# Patient Record
Sex: FEMALE | Race: WHITE | NOT HISPANIC OR LATINO
[De-identification: names, ages, dates, MRNs, and addresses within clinical notes are randomized per-mention and may not be internally consistent; named-entity substitution may affect disease eponyms.]

---

## 2017-01-09 ENCOUNTER — APPOINTMENT (OUTPATIENT)
Dept: DERMATOLOGY | Facility: CLINIC | Age: 77
End: 2017-01-09

## 2017-02-14 ENCOUNTER — APPOINTMENT (OUTPATIENT)
Dept: PLASTIC SURGERY | Facility: CLINIC | Age: 77
End: 2017-02-14

## 2017-02-14 ENCOUNTER — APPOINTMENT (OUTPATIENT)
Dept: DERMATOLOGY | Facility: CLINIC | Age: 77
End: 2017-02-14

## 2017-02-15 NOTE — ASU PATIENT PROFILE, ADULT - VISION (WITH CORRECTIVE LENSES IF THE PATIENT USUALLY WEARS THEM):
glasses for reading/Normal vision: sees adequately in most situations; can see medication labels, newsprint

## 2017-02-15 NOTE — ASU PATIENT PROFILE, ADULT - PMH
Acute deep vein thrombosis (DVT) of lower extremity, unspecified laterality, unspecified vein    BCC (basal cell carcinoma of skin)    Essential hypertension    Hypothyroidism, unspecified type    SCC (squamous cell carcinoma)    Spinal stenosis, unspecified spinal region

## 2017-02-16 ENCOUNTER — INPATIENT (INPATIENT)
Facility: HOSPITAL | Age: 77
LOS: 0 days | Discharge: HOME | End: 2017-02-17
Attending: SURGERY | Admitting: SURGERY
Payer: COMMERCIAL

## 2017-02-16 VITALS
DIASTOLIC BLOOD PRESSURE: 67 MMHG | HEART RATE: 87 BPM | SYSTOLIC BLOOD PRESSURE: 146 MMHG | RESPIRATION RATE: 18 BRPM | OXYGEN SATURATION: 97 % | WEIGHT: 110.01 LBS | TEMPERATURE: 98 F

## 2017-02-16 DIAGNOSIS — C44.319 BASAL CELL CARCINOMA OF SKIN OF OTHER PARTS OF FACE: ICD-10-CM

## 2017-02-16 DIAGNOSIS — Z90.710 ACQUIRED ABSENCE OF BOTH CERVIX AND UTERUS: Chronic | ICD-10-CM

## 2017-02-16 LAB
APTT BLD: 37.6 SEC — HIGH (ref 27.5–37.4)
BLD GP AB SCN SERPL QL: NEGATIVE — SIGNIFICANT CHANGE UP
GAS PNL BLDV: 135 MMOL/L — LOW (ref 136–146)
GLUCOSE BLDV-MCNC: 90 — SIGNIFICANT CHANGE UP (ref 70–99)
HCT VFR BLDV CALC: 27 % — LOW (ref 34.5–45)
INR BLD: 1.77 — HIGH (ref 0.87–1.18)
POTASSIUM BLDV-SCNC: 4 MMOL/L — SIGNIFICANT CHANGE UP (ref 3.4–4.5)
PROTHROM AB SERPL-ACNC: 20.3 SEC — HIGH (ref 10–13.1)
RH IG SCN BLD-IMP: POSITIVE — SIGNIFICANT CHANGE UP

## 2017-02-16 PROCEDURE — 15731 FOREHEAD FLAP W/VASC PEDICLE: CPT

## 2017-02-16 PROCEDURE — 14040 TIS TRNFR F/C/C/M/N/A/G/H/F: CPT

## 2017-02-16 PROCEDURE — 15260 FTH/GFT FR N/E/E/L 20 SQCM/<: CPT

## 2017-02-16 RX ORDER — SODIUM CHLORIDE 9 MG/ML
500 INJECTION, SOLUTION INTRAVENOUS ONCE
Qty: 0 | Refills: 0 | Status: COMPLETED | OUTPATIENT
Start: 2017-02-16 | End: 2017-02-16

## 2017-02-16 RX ORDER — LEVOTHYROXINE SODIUM 125 MCG
50 TABLET ORAL DAILY
Qty: 0 | Refills: 0 | Status: DISCONTINUED | OUTPATIENT
Start: 2017-02-16 | End: 2017-02-17

## 2017-02-16 RX ORDER — ACETAMINOPHEN 500 MG
650 TABLET ORAL EVERY 6 HOURS
Qty: 0 | Refills: 0 | Status: DISCONTINUED | OUTPATIENT
Start: 2017-02-16 | End: 2017-02-17

## 2017-02-16 RX ORDER — ONDANSETRON 8 MG/1
4 TABLET, FILM COATED ORAL ONCE
Qty: 0 | Refills: 0 | Status: DISCONTINUED | OUTPATIENT
Start: 2017-02-16 | End: 2017-02-16

## 2017-02-16 RX ORDER — SODIUM CHLORIDE 9 MG/ML
1000 INJECTION, SOLUTION INTRAVENOUS
Qty: 0 | Refills: 0 | Status: DISCONTINUED | OUTPATIENT
Start: 2017-02-16 | End: 2017-02-17

## 2017-02-16 RX ORDER — FENTANYL CITRATE 50 UG/ML
25 INJECTION INTRAVENOUS
Qty: 0 | Refills: 0 | Status: DISCONTINUED | OUTPATIENT
Start: 2017-02-16 | End: 2017-02-16

## 2017-02-16 RX ORDER — DIPHENHYDRAMINE HCL 50 MG
50 CAPSULE ORAL EVERY 4 HOURS
Qty: 0 | Refills: 0 | Status: DISCONTINUED | OUTPATIENT
Start: 2017-02-16 | End: 2017-02-17

## 2017-02-16 RX ORDER — BUDESONIDE AND FORMOTEROL FUMARATE DIHYDRATE 160; 4.5 UG/1; UG/1
2 AEROSOL RESPIRATORY (INHALATION)
Qty: 0 | Refills: 0 | Status: DISCONTINUED | OUTPATIENT
Start: 2017-02-16 | End: 2017-02-17

## 2017-02-16 RX ORDER — LOSARTAN POTASSIUM 100 MG/1
100 TABLET, FILM COATED ORAL DAILY
Qty: 0 | Refills: 0 | Status: DISCONTINUED | OUTPATIENT
Start: 2017-02-16 | End: 2017-02-17

## 2017-02-16 RX ORDER — OXYCODONE HYDROCHLORIDE 5 MG/1
5 TABLET ORAL EVERY 4 HOURS
Qty: 0 | Refills: 0 | Status: DISCONTINUED | OUTPATIENT
Start: 2017-02-16 | End: 2017-02-16

## 2017-02-16 RX ORDER — ONDANSETRON 8 MG/1
4 TABLET, FILM COATED ORAL EVERY 6 HOURS
Qty: 0 | Refills: 0 | Status: DISCONTINUED | OUTPATIENT
Start: 2017-02-16 | End: 2017-02-17

## 2017-02-16 RX ORDER — FENTANYL CITRATE 50 UG/ML
50 INJECTION INTRAVENOUS
Qty: 0 | Refills: 0 | Status: DISCONTINUED | OUTPATIENT
Start: 2017-02-16 | End: 2017-02-16

## 2017-02-16 RX ORDER — ALBUTEROL 90 UG/1
2.5 AEROSOL, METERED ORAL ONCE
Qty: 0 | Refills: 0 | Status: COMPLETED | OUTPATIENT
Start: 2017-02-16 | End: 2017-02-16

## 2017-02-16 RX ORDER — AMLODIPINE BESYLATE 2.5 MG/1
5 TABLET ORAL DAILY
Qty: 0 | Refills: 0 | Status: DISCONTINUED | OUTPATIENT
Start: 2017-02-16 | End: 2017-02-17

## 2017-02-16 RX ORDER — SODIUM CHLORIDE 9 MG/ML
1000 INJECTION, SOLUTION INTRAVENOUS
Qty: 0 | Refills: 0 | Status: DISCONTINUED | OUTPATIENT
Start: 2017-02-16 | End: 2017-02-16

## 2017-02-16 RX ADMIN — SODIUM CHLORIDE 75 MILLILITER(S): 9 INJECTION, SOLUTION INTRAVENOUS at 17:55

## 2017-02-16 RX ADMIN — BUDESONIDE AND FORMOTEROL FUMARATE DIHYDRATE 2 PUFF(S): 160; 4.5 AEROSOL RESPIRATORY (INHALATION) at 23:20

## 2017-02-16 RX ADMIN — SODIUM CHLORIDE 1000 MILLILITER(S): 9 INJECTION, SOLUTION INTRAVENOUS at 18:05

## 2017-02-16 RX ADMIN — SODIUM CHLORIDE 75 MILLILITER(S): 9 INJECTION, SOLUTION INTRAVENOUS at 22:03

## 2017-02-16 RX ADMIN — ALBUTEROL 2.5 MILLIGRAM(S): 90 AEROSOL, METERED ORAL at 19:19

## 2017-02-16 RX ADMIN — Medication 100 MILLIGRAM(S): at 22:03

## 2017-02-16 NOTE — H&P ADULT. - ENMT COMMENTS
nasal wound nasal wound with right total alar excision, 50% of left ala intact, exposed septum, total dorsal nasal skin loss, loss of upper vestibular mucosa b/l

## 2017-02-16 NOTE — H&P ADULT. - HISTORY OF PRESENT ILLNESS
Patient is a 75yo female with a history of multiple skin cancers including basal cell to the nose, SCC of the lip, and BCC to the left forehead. Patient underwent excision of nasal BCC in 2015 with inadequate margins. She underwent moh's excision of the BCC which left a near total defect of nasal skin. Patient with history of DVT in october 2016, currently on coumadin, but held for past 4 days. She is here today to undergo nasal reconstruction.  PMH: SCC and BCC, breast cancer, hypothyroidism  PSxHx: mastectomy, radiation to lip, hysterectomy, SCC and BCC excisions of the face Patient is a 77yo female with a history of multiple skin cancers including basal cell to the nose, SCC of the lip, and BCC to the left forehead. Patient underwent excision of nasal BCC in 2015 with inadequate margins. She underwent moh's excision of the BCC which left a near total defect of nasal skin. Patient with history of DVT in october 2016, currently on coumadin, but held for past 4 days. She is here today to undergo nasal reconstruction.  PMH: SCC and BCC, breast cancer, hypothyroidism, COPD  PSxHx: lumpectomy, radiation to lip, hysterectomy, SCC and BCC excisions of the face

## 2017-02-17 ENCOUNTER — TRANSCRIPTION ENCOUNTER (OUTPATIENT)
Age: 77
End: 2017-02-17

## 2017-02-17 VITALS
TEMPERATURE: 98 F | OXYGEN SATURATION: 98 % | HEART RATE: 96 BPM | RESPIRATION RATE: 16 BRPM | SYSTOLIC BLOOD PRESSURE: 109 MMHG | DIASTOLIC BLOOD PRESSURE: 52 MMHG

## 2017-02-17 RX ORDER — ACETAMINOPHEN 500 MG
2 TABLET ORAL
Qty: 0 | Refills: 0 | COMMUNITY
Start: 2017-02-17

## 2017-02-17 RX ORDER — OXYCODONE HYDROCHLORIDE 5 MG/1
1 TABLET ORAL
Qty: 30 | Refills: 0 | OUTPATIENT
Start: 2017-02-17 | End: 2017-02-22

## 2017-02-17 RX ADMIN — Medication 100 MILLIGRAM(S): at 05:21

## 2017-02-17 RX ADMIN — Medication 20 MILLIGRAM(S): at 13:25

## 2017-02-17 RX ADMIN — BUDESONIDE AND FORMOTEROL FUMARATE DIHYDRATE 2 PUFF(S): 160; 4.5 AEROSOL RESPIRATORY (INHALATION) at 13:25

## 2017-02-17 RX ADMIN — Medication 50 MICROGRAM(S): at 05:20

## 2017-02-17 RX ADMIN — LOSARTAN POTASSIUM 100 MILLIGRAM(S): 100 TABLET, FILM COATED ORAL at 06:01

## 2017-02-17 RX ADMIN — SODIUM CHLORIDE 75 MILLILITER(S): 9 INJECTION, SOLUTION INTRAVENOUS at 06:01

## 2017-02-17 RX ADMIN — Medication 100 MILLIGRAM(S): at 13:25

## 2017-02-17 RX ADMIN — AMLODIPINE BESYLATE 5 MILLIGRAM(S): 2.5 TABLET ORAL at 06:01

## 2017-02-17 NOTE — DISCHARGE NOTE ADULT - CARE PLAN
Principal Discharge DX:	BCC (basal cell carcinoma of skin)  Goal:	Postoperative Recovery  Instructions for follow-up, activity and diet:	Resume normal diet and activity. Avoid straining, exercise, or heavy lifting.    Take medications as instructed by prescriptions.  You are being discharged with a prescription for antibiotics and for medications to help with pain.    Follow-up with primary care doctor as well WITHIN 5 DAYS OF DISCHARGE TO RESUME COUMADIN    Call 911 and return to the ED for chest pain, shortness of breath, significant increase in pain, or significant change in color of surgical sites.  Goal:	Wound Care  Instructions for follow-up, activity and diet:	You should keep your wounds dry and away from water for 2 days after you leave the hospital.  After that, you may bathe and allow water to run over your wounds but please do not scrub your incisions or rub them dry.  You will be discharged with Visiting Nurse Services to perform daily dressing changes as follows:  - Remove existing xeroform from wound. Apply bacitracin to wound and incisions. Reapply new xeroform. Principal Discharge DX:	BCC (basal cell carcinoma of skin)  Goal:	Postoperative Recovery  Instructions for follow-up, activity and diet:	Resume normal diet and activity. Avoid straining, exercise, or heavy lifting.    Take medications as instructed by prescriptions.  You are being discharged with a prescription for antibiotics and for medications to help with pain.    Follow-up with primary care doctor as well WITHIN 5 DAYS OF DISCHARGE TO RESUME COUMADIN    Call 911 and return to the ED for chest pain, shortness of breath, significant increase in pain, or significant change in color of surgical sites.  Goal:	Wound Care  Instructions for follow-up, activity and diet:	You should keep your wounds dry and away from water for 2 days after you leave the hospital.  After that, you may bathe and allow water to run over your wounds but please do not scrub your incisions or rub them dry.  You will be discharged with Visiting Nurse Services to perform daily dressing changes as follows:  - Remove existing xeroform from wound on face and forehead. Apply bacitracin to wound and incisions. Reapply new xeroform.  You may then apply gauze on top of the xeroform and wrap the forehead in kerlix to keep the gauze in place.

## 2017-02-17 NOTE — DISCHARGE NOTE ADULT - PLAN OF CARE
Postoperative Recovery Resume normal diet and activity. Avoid straining, exercise, or heavy lifting.    Take medications as instructed by prescriptions.  You are being discharged with a prescription for antibiotics and for medications to help with pain.    Follow-up with primary care doctor as well WITHIN 5 DAYS OF DISCHARGE TO RESUME COUMADIN    Call 911 and return to the ED for chest pain, shortness of breath, significant increase in pain, or significant change in color of surgical sites. Wound Care You should keep your wounds dry and away from water for 2 days after you leave the hospital.  After that, you may bathe and allow water to run over your wounds but please do not scrub your incisions or rub them dry.  You will be discharged with Visiting Nurse Services to perform daily dressing changes as follows:  - Remove existing xeroform from wound. Apply bacitracin to wound and incisions. Reapply new xeroform. You should keep your wounds dry and away from water for 2 days after you leave the hospital.  After that, you may bathe and allow water to run over your wounds but please do not scrub your incisions or rub them dry.  You will be discharged with Visiting Nurse Services to perform daily dressing changes as follows:  - Remove existing xeroform from wound on face and forehead. Apply bacitracin to wound and incisions. Reapply new xeroform.  You may then apply gauze on top of the xeroform and wrap the forehead in kerlix to keep the gauze in place.

## 2017-02-17 NOTE — DISCHARGE NOTE ADULT - VISION (WITH CORRECTIVE LENSES IF THE PATIENT USUALLY WEARS THEM):
Normal vision: sees adequately in most situations; can see medication labels, newsprint/glasses for reading

## 2017-02-17 NOTE — DISCHARGE NOTE ADULT - CARE PROVIDERS DIRECT ADDRESSES
[email or web address]

## 2017-02-17 NOTE — DISCHARGE NOTE ADULT - CARE PROVIDER_API CALL
Feng Juan), Plastic Surgery  [address] [place] [address]  Phone: [phone]  Fax: [phone]

## 2017-02-17 NOTE — DISCHARGE NOTE ADULT - ADDITIONAL INSTRUCTIONS
Please follow up with Dr. Juan after discharge from the hospital. You may call [phone] to schedule an appointment. He would like you to follow up next week.

## 2017-02-17 NOTE — DISCHARGE NOTE ADULT - HOME CARE AGENCY
[facility] [phone]. Initial visit will be day after discharge home. A nurse will call prior to home visit

## 2017-02-17 NOTE — DISCHARGE NOTE ADULT - HOSPITAL COURSE
Ms. Gaytan underwent nasal reconstruction following excision of basal cell carcinoma with Dr. Juan in the OR on 2/16. The patient's nasal defect reconstruction was a first stage reconstruction using a paramedian forehead flap. The patient tolerated the procedure well. There were no complications. The patient was extubated in the OR. The patient was transferred to the PACU in stable condition. Overnight, the patient was monitored on a surgical floor and her pain was controlled with oral medications. On POD 1, the patient's pain was controlled and she was able to tolerate dressing changes. It was determined that she was stable for discharge with visiting nurse services to continue to provide daily dressing changes. At the time of discharge, the patient was hemodynamically stable, was tolerating PO diet, was voiding urine, was ambulating, and was comfortable with adequate pain control.

## 2017-02-17 NOTE — DISCHARGE NOTE ADULT - MEDICATION SUMMARY - MEDICATIONS TO TAKE
I will START or STAY ON the medications listed below when I get home from the hospital:    acetaminophen 325 mg oral tablet  -- 2 tab(s) by mouth every 6 hours, As needed, For Temp greater than 38 C (100.4 F)  -- Indication: For For fever    acetaminophen 325 mg oral tablet  -- 2 tab(s) by mouth every 6 hours, As needed, Mild Pain (1 - 3)  -- Indication: For For mild pain    acetaminophen-oxyCODONE 325 mg-5 mg oral tablet  -- 1 tab(s) by mouth every 4 hours, As needed, Moderate Pain (4 - 6) -for severe pain MDD:6 tabs  -- Indication: For For moderate to severe pain    losartan 100 mg oral tablet  -- 1 tab(s) by mouth once a day  -- Indication: For Home medication    Paxil CR 25 mg oral tablet, extended release  -- 1 tab(s) by mouth once a day (in the morning)  -- Indication: For Home medication    Symbicort 160 mcg-4.5 mcg/inh inhalation aerosol  -- 2 puff(s) inhaled 2 times a day  -- Indication: For Home medication    amLODIPine 5 mg oral tablet  -- 1 tab(s) by mouth once a day  -- Indication: For Home medication    clindamycin  -- 1 tab(s) by mouth 3 times a day  -- Indication: For Antibiotics    Synthroid 50 mcg (0.05 mg) oral tablet  -- 1 tab(s) by mouth once a day  -- Indication: For Home medication

## 2017-02-18 ENCOUNTER — TRANSCRIPTION ENCOUNTER (OUTPATIENT)
Age: 77
End: 2017-02-18

## 2017-02-23 ENCOUNTER — APPOINTMENT (OUTPATIENT)
Dept: PLASTIC SURGERY | Facility: CLINIC | Age: 77
End: 2017-02-23

## 2017-02-23 PROBLEM — I10 ESSENTIAL (PRIMARY) HYPERTENSION: Chronic | Status: ACTIVE | Noted: 2017-02-16

## 2017-02-23 PROBLEM — M48.00 SPINAL STENOSIS, SITE UNSPECIFIED: Chronic | Status: ACTIVE | Noted: 2017-02-16

## 2017-02-23 PROBLEM — E03.9 HYPOTHYROIDISM, UNSPECIFIED: Chronic | Status: ACTIVE | Noted: 2017-02-16

## 2017-03-02 ENCOUNTER — APPOINTMENT (OUTPATIENT)
Dept: PLASTIC SURGERY | Facility: CLINIC | Age: 77
End: 2017-03-02

## 2017-03-09 NOTE — BRIEF OPERATIVE NOTE - OPERATION/FINDINGS
Nose reconstructed with Right forehead flap. Skin flap taken from right chest for reconstruction of nasal mucosa. No complications occurred.
solids

## 2017-03-22 ENCOUNTER — OUTPATIENT (OUTPATIENT)
Dept: OUTPATIENT SERVICES | Facility: HOSPITAL | Age: 77
LOS: 1 days | End: 2017-03-22
Payer: COMMERCIAL

## 2017-03-22 VITALS
WEIGHT: 104.94 LBS | DIASTOLIC BLOOD PRESSURE: 73 MMHG | OXYGEN SATURATION: 98 % | HEIGHT: 60 IN | RESPIRATION RATE: 16 BRPM | HEART RATE: 77 BPM | TEMPERATURE: 97 F | SYSTOLIC BLOOD PRESSURE: 120 MMHG

## 2017-03-22 DIAGNOSIS — I10 ESSENTIAL (PRIMARY) HYPERTENSION: ICD-10-CM

## 2017-03-22 DIAGNOSIS — J44.9 CHRONIC OBSTRUCTIVE PULMONARY DISEASE, UNSPECIFIED: ICD-10-CM

## 2017-03-22 DIAGNOSIS — Z90.12 ACQUIRED ABSENCE OF LEFT BREAST AND NIPPLE: Chronic | ICD-10-CM

## 2017-03-22 DIAGNOSIS — H26.9 UNSPECIFIED CATARACT: Chronic | ICD-10-CM

## 2017-03-22 DIAGNOSIS — C44.311 BASAL CELL CARCINOMA OF SKIN OF NOSE: ICD-10-CM

## 2017-03-22 DIAGNOSIS — Z90.710 ACQUIRED ABSENCE OF BOTH CERVIX AND UTERUS: Chronic | ICD-10-CM

## 2017-03-22 DIAGNOSIS — Z98.890 OTHER SPECIFIED POSTPROCEDURAL STATES: Chronic | ICD-10-CM

## 2017-03-22 DIAGNOSIS — Z01.818 ENCOUNTER FOR OTHER PREPROCEDURAL EXAMINATION: ICD-10-CM

## 2017-03-22 DIAGNOSIS — I82.512 CHRONIC EMBOLISM AND THROMBOSIS OF LEFT FEMORAL VEIN: ICD-10-CM

## 2017-03-22 PROCEDURE — G0463: CPT

## 2017-03-22 RX ORDER — SODIUM CHLORIDE 9 MG/ML
3 INJECTION INTRAMUSCULAR; INTRAVENOUS; SUBCUTANEOUS EVERY 8 HOURS
Qty: 0 | Refills: 0 | Status: DISCONTINUED | OUTPATIENT
Start: 2017-03-29 | End: 2017-04-13

## 2017-03-22 RX ORDER — LIDOCAINE HCL 20 MG/ML
0.2 VIAL (ML) INJECTION ONCE
Qty: 0 | Refills: 0 | Status: DISCONTINUED | OUTPATIENT
Start: 2017-03-29 | End: 2017-04-13

## 2017-03-22 NOTE — H&P PST ADULT - HISTORY OF PRESENT ILLNESS
Patient is a 75yo female with a history of multiple skin cancers including basal cell to the nose, SCC of the lip, and BCC to the left forehead. Patient underwent excision of nasal BCC in 2015 with inadequate margins. She underwent moh's excision of the BCC which left a near total defect of nasal skin. Patient with history of DVT in october 2016, currently on coumadin, but held for past 4 days. She is here today to undergo nasal reconstruction.  PMH: SCC and BCC, breast cancer, hypothyroidism, COPD  PSxHx: lumpectomy, radiation to lip, hysterectomy, SCC and BCC excisions of the face Patient is a 77yo female with a history of multiple skin cancers including basal cell to the nose, SCC of the lip, and BCC to the left forehead. Patient underwent excision of nasal BCC in 2015 with inadequate margins. She underwent moh's excision of the BCC which left a near total defect of nasal skin. Patient with history of DVT in october 2016, currently on coumadin. S/P  nasal reconstruction 2/2017. Presents Nasal reconstruction, Flap revision costal cartilage graft  PMH: SCC and BCC, breast cancer, hypothyroidism, COPD  PSxHx: lumpectomy, radiation to lip, hysterectomy, SCC and BCC excisions of the face

## 2017-03-22 NOTE — H&P PST ADULT - PMH
Acute deep vein thrombosis (DVT) of lower extremity, unspecified laterality, unspecified vein    BCC (basal cell carcinoma of skin)    Essential hypertension    Hypothyroidism, unspecified type    SCC (squamous cell carcinoma)    Spinal stenosis, unspecified spinal region Acute deep vein thrombosis (DVT) of lower extremity, unspecified laterality, unspecified vein  left leg 10/2016 on Warfarin  BCC (basal cell carcinoma of skin)  s/p MOHS  Chronic obstructive pulmonary disease, unspecified COPD type  Pt was never hospitalized  for COPD  Essential hypertension    Glaucoma    Hypothyroidism, unspecified type    SCC (squamous cell carcinoma)    Spinal stenosis, unspecified spinal region

## 2017-03-22 NOTE — H&P PST ADULT - PROBLEM SELECTOR PLAN 1
Nasal Reconstruction, Flap Revision Costal Cartilage Graft  Check MMR for labs from Hematologist  Obtain copy of MC from 2/2017 Nasal Reconstruction, Flap Revision Costal Cartilage Graft  Check MMF for labs from Hematologist  Obtain copy of MC from 2/2017

## 2017-03-22 NOTE — H&P PST ADULT - PROBLEM SELECTOR PLAN 2
Dr Juan advised pt she can stay on Warfarin Spoke to Ashley Juan office and pt can stop Warfarin 3/24/17 , Called pt and left message. Spoke to Ashley Juan office and pt can stop Warfarin 3/24/17 , Called pt and left message. Ashley states to do STAT PT INR DOS

## 2017-03-22 NOTE — H&P PST ADULT - NSANTHOSAYNRD_GEN_A_CORE
No. YOSEF screening performed.  STOP BANG Legend: 0-2 = LOW Risk; 3-4 = INTERMEDIATE Risk; 5-8 = HIGH Risk

## 2017-03-22 NOTE — H&P PST ADULT - PSH
H/O total hysterectomy Bilateral cataracts    H/O total hysterectomy    S/P inguinal hernia repair    S/P lumpectomy, left breast  1995 & RADS  Status post nasal surgery  nasal reconstruction

## 2017-03-29 ENCOUNTER — OUTPATIENT (OUTPATIENT)
Dept: OUTPATIENT SERVICES | Facility: HOSPITAL | Age: 77
LOS: 1 days | End: 2017-03-29
Payer: COMMERCIAL

## 2017-03-29 VITALS
RESPIRATION RATE: 18 BRPM | DIASTOLIC BLOOD PRESSURE: 86 MMHG | HEART RATE: 79 BPM | OXYGEN SATURATION: 99 % | SYSTOLIC BLOOD PRESSURE: 146 MMHG

## 2017-03-29 VITALS
SYSTOLIC BLOOD PRESSURE: 98 MMHG | HEART RATE: 75 BPM | DIASTOLIC BLOOD PRESSURE: 63 MMHG | TEMPERATURE: 98 F | HEIGHT: 60 IN | OXYGEN SATURATION: 98 % | RESPIRATION RATE: 16 BRPM | WEIGHT: 104.94 LBS

## 2017-03-29 DIAGNOSIS — Z90.710 ACQUIRED ABSENCE OF BOTH CERVIX AND UTERUS: Chronic | ICD-10-CM

## 2017-03-29 DIAGNOSIS — Z98.890 OTHER SPECIFIED POSTPROCEDURAL STATES: Chronic | ICD-10-CM

## 2017-03-29 DIAGNOSIS — Z90.12 ACQUIRED ABSENCE OF LEFT BREAST AND NIPPLE: Chronic | ICD-10-CM

## 2017-03-29 DIAGNOSIS — H26.9 UNSPECIFIED CATARACT: Chronic | ICD-10-CM

## 2017-03-29 DIAGNOSIS — C44.311 BASAL CELL CARCINOMA OF SKIN OF NOSE: ICD-10-CM

## 2017-03-29 DIAGNOSIS — Z01.818 ENCOUNTER FOR OTHER PREPROCEDURAL EXAMINATION: ICD-10-CM

## 2017-03-29 PROCEDURE — 15731 FOREHEAD FLAP W/VASC PEDICLE: CPT

## 2017-03-29 PROCEDURE — 21235 EAR CARTILAGE GRAFT: CPT

## 2017-03-29 PROCEDURE — 30520 REPAIR OF NASAL SEPTUM: CPT | Mod: 58

## 2017-03-29 PROCEDURE — 30520 REPAIR OF NASAL SEPTUM: CPT

## 2017-03-29 PROCEDURE — 21235 EAR CARTILAGE GRAFT: CPT | Mod: 58,RT

## 2017-03-29 PROCEDURE — 14061 TIS TRNFR E/N/E/L10.1-30SQCM: CPT

## 2017-03-29 PROCEDURE — 14061 TIS TRNFR E/N/E/L10.1-30SQCM: CPT | Mod: 58

## 2017-03-29 RX ORDER — SODIUM CHLORIDE 9 MG/ML
1000 INJECTION INTRAMUSCULAR; INTRAVENOUS; SUBCUTANEOUS
Qty: 0 | Refills: 0 | Status: DISCONTINUED | OUTPATIENT
Start: 2017-03-29 | End: 2017-04-13

## 2017-03-29 RX ORDER — ONDANSETRON 8 MG/1
4 TABLET, FILM COATED ORAL ONCE
Qty: 0 | Refills: 0 | Status: COMPLETED | OUTPATIENT
Start: 2017-03-29 | End: 2017-03-29

## 2017-03-29 RX ORDER — ACETAMINOPHEN 500 MG
975 TABLET ORAL ONCE
Qty: 0 | Refills: 0 | Status: COMPLETED | OUTPATIENT
Start: 2017-03-29 | End: 2017-03-29

## 2017-03-29 RX ORDER — CELECOXIB 200 MG/1
200 CAPSULE ORAL ONCE
Qty: 0 | Refills: 0 | Status: DISCONTINUED | OUTPATIENT
Start: 2017-03-29 | End: 2017-04-13

## 2017-03-29 RX ORDER — OXYCODONE HYDROCHLORIDE 5 MG/1
5 TABLET ORAL ONCE
Qty: 0 | Refills: 0 | Status: DISCONTINUED | OUTPATIENT
Start: 2017-03-29 | End: 2017-03-29

## 2017-03-29 RX ORDER — CELECOXIB 200 MG/1
200 CAPSULE ORAL ONCE
Qty: 0 | Refills: 0 | Status: COMPLETED | OUTPATIENT
Start: 2017-03-29 | End: 2017-03-29

## 2017-03-29 RX ADMIN — OXYCODONE HYDROCHLORIDE 5 MILLIGRAM(S): 5 TABLET ORAL at 14:58

## 2017-03-29 RX ADMIN — ONDANSETRON 4 MILLIGRAM(S): 8 TABLET, FILM COATED ORAL at 15:03

## 2017-03-29 NOTE — BRIEF OPERATIVE NOTE - PROCEDURE
Reconstruction, nasal valve, using conchal cartilage graft  03/29/2017    Active  [facility]  Inset of forehead flap or revision of inset of forehead flap  03/29/2017    Active  [facility]

## 2017-03-29 NOTE — ASU PATIENT PROFILE, ADULT - PSH
Bilateral cataracts    H/O total hysterectomy    S/P inguinal hernia repair    S/P lumpectomy, left breast  1995 & RADS  Status post nasal surgery  nasal reconstruction

## 2017-03-29 NOTE — ASU DISCHARGE PLAN (ADULT/PEDIATRIC). - NOTIFY
Fever greater than 101/Bleeding that does not stop/Pain not relieved by Medications/Persistent Nausea and Vomiting/Swelling that continues

## 2017-03-29 NOTE — ASU DISCHARGE PLAN (ADULT/PEDIATRIC). - ITEMS TO FOLLOWUP WITH YOUR PHYSICIAN'S
Please follow up with Dr. Juan within x1 week after discharge from the hospital. You may call [phone] to schedule an appointment.

## 2017-03-29 NOTE — ASU PATIENT PROFILE, ADULT - PMH
Acute deep vein thrombosis (DVT) of lower extremity, unspecified laterality, unspecified vein  left leg 10/2016 on Warfarin  BCC (basal cell carcinoma of skin)  s/p MOHS  Chronic obstructive pulmonary disease, unspecified COPD type  Pt was never hospitalized  for COPD  Essential hypertension    Glaucoma    Hypothyroidism, unspecified type    SCC (squamous cell carcinoma)    Spinal stenosis, unspecified spinal region

## 2017-03-30 ENCOUNTER — APPOINTMENT (OUTPATIENT)
Dept: PLASTIC SURGERY | Facility: CLINIC | Age: 77
End: 2017-03-30

## 2017-03-30 PROBLEM — C44.91 BASAL CELL CARCINOMA OF SKIN, UNSPECIFIED: Chronic | Status: ACTIVE | Noted: 2017-02-16

## 2017-04-04 ENCOUNTER — APPOINTMENT (OUTPATIENT)
Dept: PLASTIC SURGERY | Facility: CLINIC | Age: 77
End: 2017-04-04

## 2017-04-06 ENCOUNTER — TRANSCRIPTION ENCOUNTER (OUTPATIENT)
Age: 77
End: 2017-04-06

## 2017-04-11 ENCOUNTER — OUTPATIENT (OUTPATIENT)
Dept: OUTPATIENT SERVICES | Facility: HOSPITAL | Age: 77
LOS: 1 days | End: 2017-04-11
Payer: COMMERCIAL

## 2017-04-11 VITALS
DIASTOLIC BLOOD PRESSURE: 70 MMHG | RESPIRATION RATE: 16 BRPM | SYSTOLIC BLOOD PRESSURE: 110 MMHG | HEART RATE: 75 BPM | TEMPERATURE: 98 F | WEIGHT: 100.09 LBS | OXYGEN SATURATION: 99 %

## 2017-04-11 DIAGNOSIS — Z90.710 ACQUIRED ABSENCE OF BOTH CERVIX AND UTERUS: Chronic | ICD-10-CM

## 2017-04-11 DIAGNOSIS — C44.91 BASAL CELL CARCINOMA OF SKIN, UNSPECIFIED: ICD-10-CM

## 2017-04-11 DIAGNOSIS — Z98.890 OTHER SPECIFIED POSTPROCEDURAL STATES: Chronic | ICD-10-CM

## 2017-04-11 DIAGNOSIS — E03.9 HYPOTHYROIDISM, UNSPECIFIED: ICD-10-CM

## 2017-04-11 DIAGNOSIS — Z94.5 SKIN TRANSPLANT STATUS: Chronic | ICD-10-CM

## 2017-04-11 DIAGNOSIS — I10 ESSENTIAL (PRIMARY) HYPERTENSION: ICD-10-CM

## 2017-04-11 DIAGNOSIS — C44.310 BASAL CELL CARCINOMA OF SKIN OF UNSPECIFIED PARTS OF FACE: Chronic | ICD-10-CM

## 2017-04-11 DIAGNOSIS — I82.409 ACUTE EMBOLISM AND THROMBOSIS OF UNSPECIFIED DEEP VEINS OF UNSPECIFIED LOWER EXTREMITY: ICD-10-CM

## 2017-04-11 DIAGNOSIS — C44.311 BASAL CELL CARCINOMA OF SKIN OF NOSE: ICD-10-CM

## 2017-04-11 DIAGNOSIS — J44.9 CHRONIC OBSTRUCTIVE PULMONARY DISEASE, UNSPECIFIED: ICD-10-CM

## 2017-04-11 DIAGNOSIS — Z85.828 PERSONAL HISTORY OF OTHER MALIGNANT NEOPLASM OF SKIN: Chronic | ICD-10-CM

## 2017-04-11 DIAGNOSIS — H26.9 UNSPECIFIED CATARACT: Chronic | ICD-10-CM

## 2017-04-11 DIAGNOSIS — Z90.12 ACQUIRED ABSENCE OF LEFT BREAST AND NIPPLE: Chronic | ICD-10-CM

## 2017-04-11 LAB
APTT BLD: 44.1 SEC — HIGH (ref 27.5–37.4)
BUN SERPL-MCNC: 16 MG/DL — SIGNIFICANT CHANGE UP (ref 7–23)
CALCIUM SERPL-MCNC: 9.5 MG/DL — SIGNIFICANT CHANGE UP (ref 8.4–10.5)
CHLORIDE SERPL-SCNC: 99 MMOL/L — SIGNIFICANT CHANGE UP (ref 98–107)
CO2 SERPL-SCNC: 24 MMOL/L — SIGNIFICANT CHANGE UP (ref 22–31)
CREAT SERPL-MCNC: 0.75 MG/DL — SIGNIFICANT CHANGE UP (ref 0.5–1.3)
GLUCOSE SERPL-MCNC: 86 MG/DL — SIGNIFICANT CHANGE UP (ref 70–99)
HCT VFR BLD CALC: 29.4 % — LOW (ref 34.5–45)
HGB BLD-MCNC: 8.9 G/DL — LOW (ref 11.5–15.5)
INR BLD: 2.53 — HIGH (ref 0.88–1.17)
MCHC RBC-ENTMCNC: 24.5 PG — LOW (ref 27–34)
MCHC RBC-ENTMCNC: 30.3 % — LOW (ref 32–36)
MCV RBC AUTO: 81 FL — SIGNIFICANT CHANGE UP (ref 80–100)
PLATELET # BLD AUTO: 427 K/UL — HIGH (ref 150–400)
PMV BLD: 8.2 FL — SIGNIFICANT CHANGE UP (ref 7–13)
POTASSIUM SERPL-MCNC: 4.4 MMOL/L — SIGNIFICANT CHANGE UP (ref 3.5–5.3)
POTASSIUM SERPL-SCNC: 4.4 MMOL/L — SIGNIFICANT CHANGE UP (ref 3.5–5.3)
PROTHROM AB SERPL-ACNC: 28.9 SEC — HIGH (ref 9.8–13.1)
RBC # BLD: 3.63 M/UL — LOW (ref 3.8–5.2)
RBC # FLD: 20.8 % — HIGH (ref 10.3–14.5)
SODIUM SERPL-SCNC: 138 MMOL/L — SIGNIFICANT CHANGE UP (ref 135–145)
WBC # BLD: 7.1 K/UL — SIGNIFICANT CHANGE UP (ref 3.8–10.5)
WBC # FLD AUTO: 7.1 K/UL — SIGNIFICANT CHANGE UP (ref 3.8–10.5)

## 2017-04-11 PROCEDURE — 93010 ELECTROCARDIOGRAM REPORT: CPT

## 2017-04-11 RX ORDER — WARFARIN SODIUM 2.5 MG/1
1 TABLET ORAL
Qty: 0 | Refills: 0 | COMMUNITY

## 2017-04-11 RX ORDER — AMLODIPINE BESYLATE 2.5 MG/1
1 TABLET ORAL
Qty: 0 | Refills: 0 | COMMUNITY

## 2017-04-11 RX ORDER — LOSARTAN POTASSIUM 100 MG/1
1 TABLET, FILM COATED ORAL
Qty: 0 | Refills: 0 | COMMUNITY

## 2017-04-11 RX ORDER — BUDESONIDE AND FORMOTEROL FUMARATE DIHYDRATE 160; 4.5 UG/1; UG/1
2 AEROSOL RESPIRATORY (INHALATION)
Qty: 0 | Refills: 0 | COMMUNITY

## 2017-04-11 RX ORDER — LEVOTHYROXINE SODIUM 125 MCG
1 TABLET ORAL
Qty: 0 | Refills: 0 | COMMUNITY

## 2017-04-11 RX ORDER — FAMOTIDINE 10 MG/ML
1 INJECTION INTRAVENOUS
Qty: 0 | Refills: 0 | COMMUNITY

## 2017-04-11 NOTE — H&P PST ADULT - NEGATIVE CARDIOVASCULAR SYMPTOMS
no palpitations/no peripheral edema/no claudication/no paroxysmal nocturnal dyspnea/no orthopnea/no chest pain/no dyspnea on exertion

## 2017-04-11 NOTE — H&P PST ADULT - HISTORY OF PRESENT ILLNESS
76 year old female with Hx of basal cell carcinoma of skin, and multiple skin cancers including SCC of the lip, and BCC to the left forehead. Pt underwent nasal reconstruction, costal cartilage graft, flap revision on 3/29/17. Pt is scheduled for forehead flap takedown, local tissue rearrangement for 4/19/17. Denies bleeding, swelling, and pain to area. Changes dressing d aily

## 2017-04-11 NOTE — H&P PST ADULT - NEGATIVE ENMT SYMPTOMS
no nasal obstruction/no nasal congestion/no dysphagia/no ear pain/no tinnitus/no hearing difficulty/no sinus symptoms/no throat pain/no nasal discharge/no vertigo

## 2017-04-11 NOTE — H&P PST ADULT - NEGATIVE GENERAL GENITOURINARY SYMPTOMS
no flank pain L/no dysuria/no renal colic/no hematuria/no urinary hesitancy/no incontinence/normal urinary frequency/no bladder infections/no flank pain R

## 2017-04-11 NOTE — H&P PST ADULT - NEGATIVE NEUROLOGICAL SYMPTOMS
no focal seizures/no syncope/no loss of sensation/no tremors/no headache/no transient paralysis/no vertigo/no difficulty walking/no confusion/no weakness/no generalized seizures/no paresthesias/no facial palsy

## 2017-04-11 NOTE — H&P PST ADULT - RS GEN PE MLT RESP DETAILS PC
no subcutaneous emphysema/no chest wall tenderness/no rales/no intercostal retractions/good air movement/clear to auscultation bilaterally/no wheezes/respirations non-labored/breath sounds equal/airway patent/no rhonchi

## 2017-04-11 NOTE — H&P PST ADULT - RESPIRATORY AND THORAX COMMENTS
COPD managed on inhalers, no recent exacerbations, never hospitalized COPD managed on inhaler, no recent exacerbations, never hospitalized

## 2017-04-11 NOTE — H&P PST ADULT - PMH
Acute deep vein thrombosis (DVT) of lower extremity, unspecified laterality, unspecified vein  left leg 10/2016 on Warfarin  BCC (basal cell carcinoma of skin)  s/p MOHS  Chronic obstructive pulmonary disease, unspecified COPD type  Pt was never hospitalized  for COPD  Essential hypertension    Glaucoma    Hypothyroidism, unspecified type    SCC (squamous cell carcinoma)    Spinal stenosis, unspecified spinal region Acute deep vein thrombosis (DVT) of lower extremity, unspecified laterality, unspecified vein  left leg 10/2016 on Warfarin  Anemia  on iron infusions  BCC (basal cell carcinoma of skin)  s/p MOHS  Chronic obstructive pulmonary disease, unspecified COPD type  Pt was never hospitalized  for COPD  Depression    Essential hypertension    Glaucoma    Hypothyroidism, unspecified type    SCC (squamous cell carcinoma)    Spinal stenosis, unspecified spinal region

## 2017-04-11 NOTE — H&P PST ADULT - PSH
Bilateral cataracts    H/O total hysterectomy    S/P inguinal hernia repair    S/P lumpectomy, left breast  1995 & RADS  Status post nasal surgery  nasal reconstruction  Status post skin flap graft  nasal reconstruction, costal cartilage graft, flap revision on 3/29/17 Bilateral cataracts    H/O total hysterectomy    History of Mohs surgery for squamous cell carcinoma of skin  2/2017  S/P inguinal hernia repair    S/P lumpectomy, left breast  1995 & RADS  Status post skin flap graft  nasal reconstruction, costal cartilage graft, flap revision on 3/29/17

## 2017-04-11 NOTE — H&P PST ADULT - PROBLEM SELECTOR PLAN 1
Pt is scheduled for forehead flap takedown, local tissue rearrangement for 4/19/17. Preop instructions, Pepcid provided. Pt stated understanding

## 2017-04-11 NOTE — H&P PST ADULT - NEGATIVE MUSCULOSKELETAL SYMPTOMS
no myalgia/no stiffness/no muscle weakness/no arthralgia/no arthritis/no joint swelling/no neck pain

## 2017-04-11 NOTE — H&P PST ADULT - MUSCULOSKELETAL
details… detailed exam normal strength/no joint erythema/ROM intact/no joint warmth/no calf tenderness/no joint swelling

## 2017-04-11 NOTE — H&P PST ADULT - GASTROINTESTINAL DETAILS
soft/no guarding/no organomegaly/no bruit/no rigidity/nontender/no rebound tenderness/no masses palpable/no distention/bowel sounds normal

## 2017-04-19 ENCOUNTER — OUTPATIENT (OUTPATIENT)
Dept: OUTPATIENT SERVICES | Facility: HOSPITAL | Age: 77
LOS: 1 days | Discharge: HOME | End: 2017-04-19
Payer: COMMERCIAL

## 2017-04-19 VITALS
OXYGEN SATURATION: 97 % | SYSTOLIC BLOOD PRESSURE: 107 MMHG | HEART RATE: 84 BPM | RESPIRATION RATE: 16 BRPM | DIASTOLIC BLOOD PRESSURE: 52 MMHG | TEMPERATURE: 98 F

## 2017-04-19 VITALS
DIASTOLIC BLOOD PRESSURE: 50 MMHG | SYSTOLIC BLOOD PRESSURE: 130 MMHG | WEIGHT: 100.09 LBS | RESPIRATION RATE: 18 BRPM | TEMPERATURE: 97 F | HEART RATE: 78 BPM | OXYGEN SATURATION: 100 %

## 2017-04-19 DIAGNOSIS — C44.311 BASAL CELL CARCINOMA OF SKIN OF NOSE: ICD-10-CM

## 2017-04-19 DIAGNOSIS — Z90.710 ACQUIRED ABSENCE OF BOTH CERVIX AND UTERUS: Chronic | ICD-10-CM

## 2017-04-19 DIAGNOSIS — Z94.5 SKIN TRANSPLANT STATUS: Chronic | ICD-10-CM

## 2017-04-19 DIAGNOSIS — Z85.828 PERSONAL HISTORY OF OTHER MALIGNANT NEOPLASM OF SKIN: Chronic | ICD-10-CM

## 2017-04-19 DIAGNOSIS — H26.9 UNSPECIFIED CATARACT: Chronic | ICD-10-CM

## 2017-04-19 DIAGNOSIS — Z98.890 OTHER SPECIFIED POSTPROCEDURAL STATES: Chronic | ICD-10-CM

## 2017-04-19 DIAGNOSIS — Z90.12 ACQUIRED ABSENCE OF LEFT BREAST AND NIPPLE: Chronic | ICD-10-CM

## 2017-04-19 LAB
APTT BLD: 31.7 SEC — SIGNIFICANT CHANGE UP (ref 27.5–37.4)
INR BLD: 0.98 — SIGNIFICANT CHANGE UP (ref 0.88–1.17)
PROTHROM AB SERPL-ACNC: 11 SEC — SIGNIFICANT CHANGE UP (ref 9.8–13.1)

## 2017-04-19 PROCEDURE — 14040 TIS TRNFR F/C/C/M/N/A/G/H/F: CPT | Mod: 58

## 2017-04-19 PROCEDURE — 14061 TIS TRNFR E/N/E/L10.1-30SQCM: CPT | Mod: 58

## 2017-04-19 NOTE — ASU DISCHARGE PLAN (ADULT/PEDIATRIC). - NURSING INSTRUCTIONS
Narcotic pain medicine is constipating , Buy over the counter stool softener and take as instructed on the bottle.   DO NOT take any Tylenol (Acetaminophen) or narcotics containing Tylenol until after  _10pm_____ . You received Tylenol during your operation and it can cause damage to your liver if too much is taken within a 24 hour time period.

## 2017-04-19 NOTE — ASU DISCHARGE PLAN (ADULT/PEDIATRIC). - NOTIFY
Persistent Nausea and Vomiting/Unable to Urinate/Bleeding that does not stop/Fever greater than 101/Swelling that continues

## 2017-04-19 NOTE — ASU DISCHARGE PLAN (ADULT/PEDIATRIC). - INSTRUCTIONS
progress slowly,avoid any foods that are spicy, greasy or fatty, increase fluids and resume regular diet in am for next week

## 2017-04-20 ENCOUNTER — TRANSCRIPTION ENCOUNTER (OUTPATIENT)
Age: 77
End: 2017-04-20

## 2017-04-27 ENCOUNTER — APPOINTMENT (OUTPATIENT)
Dept: PLASTIC SURGERY | Facility: CLINIC | Age: 77
End: 2017-04-27

## 2017-05-16 ENCOUNTER — APPOINTMENT (OUTPATIENT)
Dept: PLASTIC SURGERY | Facility: CLINIC | Age: 77
End: 2017-05-16

## 2017-05-30 ENCOUNTER — LABORATORY RESULT (OUTPATIENT)
Age: 77
End: 2017-05-30

## 2017-05-30 ENCOUNTER — APPOINTMENT (OUTPATIENT)
Dept: DERMATOLOGY | Facility: CLINIC | Age: 77
End: 2017-05-30

## 2017-05-30 ENCOUNTER — RESULT REVIEW (OUTPATIENT)
Age: 77
End: 2017-05-30

## 2017-05-30 RX ORDER — PAROXETINE HYDROCHLORIDE 25 MG/1
25 TABLET, FILM COATED, EXTENDED RELEASE ORAL
Qty: 90 | Refills: 0 | Status: ACTIVE | COMMUNITY
Start: 2017-05-12

## 2017-06-12 ENCOUNTER — APPOINTMENT (OUTPATIENT)
Dept: DERMATOLOGY | Facility: CLINIC | Age: 77
End: 2017-06-12

## 2017-06-13 ENCOUNTER — APPOINTMENT (OUTPATIENT)
Dept: PLASTIC SURGERY | Facility: CLINIC | Age: 77
End: 2017-06-13

## 2017-07-11 ENCOUNTER — APPOINTMENT (OUTPATIENT)
Dept: PLASTIC SURGERY | Facility: CLINIC | Age: 77
End: 2017-07-11
Payer: COMMERCIAL

## 2017-07-11 VITALS — SYSTOLIC BLOOD PRESSURE: 108 MMHG | DIASTOLIC BLOOD PRESSURE: 66 MMHG | HEART RATE: 76 BPM

## 2017-07-11 PROCEDURE — 14040 TIS TRNFR F/C/C/M/N/A/G/H/F: CPT | Mod: 78

## 2017-07-18 ENCOUNTER — APPOINTMENT (OUTPATIENT)
Dept: PLASTIC SURGERY | Facility: CLINIC | Age: 77
End: 2017-07-18

## 2017-08-15 ENCOUNTER — OUTPATIENT (OUTPATIENT)
Dept: OUTPATIENT SERVICES | Facility: HOSPITAL | Age: 77
LOS: 1 days | End: 2017-08-15
Payer: COMMERCIAL

## 2017-08-15 VITALS
HEIGHT: 61 IN | TEMPERATURE: 97 F | RESPIRATION RATE: 14 BRPM | WEIGHT: 104.06 LBS | DIASTOLIC BLOOD PRESSURE: 62 MMHG | HEART RATE: 74 BPM | SYSTOLIC BLOOD PRESSURE: 102 MMHG | OXYGEN SATURATION: 99 %

## 2017-08-15 DIAGNOSIS — Z90.710 ACQUIRED ABSENCE OF BOTH CERVIX AND UTERUS: Chronic | ICD-10-CM

## 2017-08-15 DIAGNOSIS — H26.9 UNSPECIFIED CATARACT: Chronic | ICD-10-CM

## 2017-08-15 DIAGNOSIS — C44.91 BASAL CELL CARCINOMA OF SKIN, UNSPECIFIED: ICD-10-CM

## 2017-08-15 DIAGNOSIS — C44.311 BASAL CELL CARCINOMA OF SKIN OF NOSE: ICD-10-CM

## 2017-08-15 DIAGNOSIS — Z85.828 PERSONAL HISTORY OF OTHER MALIGNANT NEOPLASM OF SKIN: Chronic | ICD-10-CM

## 2017-08-15 DIAGNOSIS — Z94.5 SKIN TRANSPLANT STATUS: Chronic | ICD-10-CM

## 2017-08-15 DIAGNOSIS — Z98.890 OTHER SPECIFIED POSTPROCEDURAL STATES: Chronic | ICD-10-CM

## 2017-08-15 DIAGNOSIS — Z90.12 ACQUIRED ABSENCE OF LEFT BREAST AND NIPPLE: Chronic | ICD-10-CM

## 2017-08-15 LAB
BUN SERPL-MCNC: 14 MG/DL — SIGNIFICANT CHANGE UP (ref 7–23)
CALCIUM SERPL-MCNC: 9.6 MG/DL — SIGNIFICANT CHANGE UP (ref 8.4–10.5)
CHLORIDE SERPL-SCNC: 98 MMOL/L — SIGNIFICANT CHANGE UP (ref 98–107)
CO2 SERPL-SCNC: 23 MMOL/L — SIGNIFICANT CHANGE UP (ref 22–31)
CREAT SERPL-MCNC: 0.76 MG/DL — SIGNIFICANT CHANGE UP (ref 0.5–1.3)
GLUCOSE SERPL-MCNC: 76 MG/DL — SIGNIFICANT CHANGE UP (ref 70–99)
HCT VFR BLD CALC: 33.4 % — LOW (ref 34.5–45)
HGB BLD-MCNC: 10.3 G/DL — LOW (ref 11.5–15.5)
MCHC RBC-ENTMCNC: 25.1 PG — LOW (ref 27–34)
MCHC RBC-ENTMCNC: 30.8 % — LOW (ref 32–36)
MCV RBC AUTO: 81.3 FL — SIGNIFICANT CHANGE UP (ref 80–100)
NRBC # FLD: 0 — SIGNIFICANT CHANGE UP
PLATELET # BLD AUTO: 362 K/UL — SIGNIFICANT CHANGE UP (ref 150–400)
PMV BLD: 8.8 FL — SIGNIFICANT CHANGE UP (ref 7–13)
POTASSIUM SERPL-MCNC: 4.5 MMOL/L — SIGNIFICANT CHANGE UP (ref 3.5–5.3)
POTASSIUM SERPL-SCNC: 4.5 MMOL/L — SIGNIFICANT CHANGE UP (ref 3.5–5.3)
RBC # BLD: 4.11 M/UL — SIGNIFICANT CHANGE UP (ref 3.8–5.2)
RBC # FLD: 17.2 % — HIGH (ref 10.3–14.5)
SODIUM SERPL-SCNC: 136 MMOL/L — SIGNIFICANT CHANGE UP (ref 135–145)
WBC # BLD: 8.88 K/UL — SIGNIFICANT CHANGE UP (ref 3.8–10.5)
WBC # FLD AUTO: 8.88 K/UL — SIGNIFICANT CHANGE UP (ref 3.8–10.5)

## 2017-08-15 PROCEDURE — 93010 ELECTROCARDIOGRAM REPORT: CPT

## 2017-08-15 NOTE — H&P PST ADULT - HISTORY OF PRESENT ILLNESS
76 year old female with a history of hypothyroidism, breast cancer s/p lumpectomy and radiation, SCC s/p MOH's, hypertension, anemia, COPD, and depression presents for pre-op evaluation for scheduled open rhinoplasty, debulking procedure scheduled for 8/23/17. 76 year old female with a history of hypothyroidism, breast cancer s/p lumpectomy and radiation, SCC on nose s/p MOH's, hypertension, anemia, COPD, and depression presents for pre-op evaluation for scheduled open rhinoplasty, debulking procedure scheduled for 8/23/17.

## 2017-08-15 NOTE — H&P PST ADULT - ASSESSMENT
76 year old female presents with basal cell carcinoma of the nose scheduled for an open rhinoplasty and debulking on 8/23/17.

## 2017-08-15 NOTE — H&P PST ADULT - PMH
Acute deep vein thrombosis (DVT) of lower extremity, unspecified laterality, unspecified vein  left leg 10/2016 on Warfarin until 6/17  Anemia  on iron infusions  BCC (basal cell carcinoma of skin)  s/p MOHS  Chronic obstructive pulmonary disease, unspecified COPD type  Pt was never hospitalized  for COPD  Depression    Essential hypertension    Glaucoma    Hypothyroidism, unspecified type    Spinal stenosis, unspecified spinal region

## 2017-08-15 NOTE — H&P PST ADULT - PSH
Bilateral cataracts    H/O total hysterectomy    History of Mohs surgery for squamous cell carcinoma of skin  2/2017  S/P inguinal hernia repair    S/P lumpectomy, left breast  1995 & RADS  Status post skin flap graft  nasal reconstruction, costal cartilage graft, flap revision on 3/29/17

## 2017-08-15 NOTE — H&P PST ADULT - WEIGHT IN LBS
Health Maintenance Summary     Topic Due On Due Status Completed On    Colorectal Cancer Screening - Colonoscopy Jun 7, 2021 Not Due Jun 7, 2011    Immunization-Zoster  Completed Apr 13, 2017    Immunization - Pneumococcal Jul 13, 2016 Overdue Jul 13, 2015    Abdominal Aortic Aneurysm (AAA) Screening  Apr 20, 2010 Overdue     Medicare Wellness Visit Jul 12, 2018 Not Due Jul 12, 2017    IMMUNIZATION - DTaP/Tdap/Td Aug 27, 2009 Overdue Aug 26, 2009    Immunization-Influenza Sep 1, 2017 Not Due Dec 16, 2015          Patient is due for topics as listed above, his wife wishes to decline Pneumo 23 vaccine at this time; wife believes he had this at [facility] ..  Contacted [facility] - no Pneumonia 23 vaccine given recently. Dr Lam to discuss AAA screening if needed.  Wife here during visit to assist with questions.  Last eye exam was April/May 2017 at [facility] per wife     104

## 2017-08-23 ENCOUNTER — OUTPATIENT (OUTPATIENT)
Dept: OUTPATIENT SERVICES | Facility: HOSPITAL | Age: 77
LOS: 1 days | Discharge: HOME | End: 2017-08-23
Payer: COMMERCIAL

## 2017-08-23 VITALS
WEIGHT: 104.06 LBS | TEMPERATURE: 98 F | DIASTOLIC BLOOD PRESSURE: 54 MMHG | HEIGHT: 61 IN | OXYGEN SATURATION: 100 % | HEART RATE: 62 BPM | RESPIRATION RATE: 16 BRPM | SYSTOLIC BLOOD PRESSURE: 129 MMHG

## 2017-08-23 VITALS
OXYGEN SATURATION: 100 % | SYSTOLIC BLOOD PRESSURE: 90 MMHG | RESPIRATION RATE: 19 BRPM | DIASTOLIC BLOOD PRESSURE: 42 MMHG | HEART RATE: 92 BPM

## 2017-08-23 DIAGNOSIS — Z90.12 ACQUIRED ABSENCE OF LEFT BREAST AND NIPPLE: Chronic | ICD-10-CM

## 2017-08-23 DIAGNOSIS — H26.9 UNSPECIFIED CATARACT: Chronic | ICD-10-CM

## 2017-08-23 DIAGNOSIS — Z85.828 PERSONAL HISTORY OF OTHER MALIGNANT NEOPLASM OF SKIN: Chronic | ICD-10-CM

## 2017-08-23 DIAGNOSIS — Z98.890 OTHER SPECIFIED POSTPROCEDURAL STATES: Chronic | ICD-10-CM

## 2017-08-23 DIAGNOSIS — Z94.5 SKIN TRANSPLANT STATUS: Chronic | ICD-10-CM

## 2017-08-23 DIAGNOSIS — Z90.710 ACQUIRED ABSENCE OF BOTH CERVIX AND UTERUS: Chronic | ICD-10-CM

## 2017-08-23 DIAGNOSIS — C44.311 BASAL CELL CARCINOMA OF SKIN OF NOSE: ICD-10-CM

## 2017-08-23 PROCEDURE — 14040 TIS TRNFR F/C/C/M/N/A/G/H/F: CPT

## 2017-08-23 PROCEDURE — 14060 TIS TRNFR E/N/E/L 10 SQ CM/<: CPT

## 2017-08-23 NOTE — ASU DISCHARGE PLAN (ADULT/PEDIATRIC). - NOTIFY
Swelling that continues/Unable to Urinate/Persistent Nausea and Vomiting/Pain not relieved by Medications/Excessive Diarrhea/Inability to Tolerate Liquids or Foods/Increased Irritability or Sluggishness/Fever greater than 101/Numbness, tingling/Bleeding that does not stop

## 2017-08-24 ENCOUNTER — TRANSCRIPTION ENCOUNTER (OUTPATIENT)
Age: 77
End: 2017-08-24

## 2017-08-29 ENCOUNTER — APPOINTMENT (OUTPATIENT)
Dept: PLASTIC SURGERY | Facility: CLINIC | Age: 77
End: 2017-08-29
Payer: MEDICARE

## 2017-08-29 PROBLEM — I82.409 ACUTE EMBOLISM AND THROMBOSIS OF UNSPECIFIED DEEP VEINS OF UNSPECIFIED LOWER EXTREMITY: Chronic | Status: ACTIVE | Noted: 2017-02-16

## 2017-08-29 PROCEDURE — 99024 POSTOP FOLLOW-UP VISIT: CPT

## 2017-09-12 ENCOUNTER — APPOINTMENT (OUTPATIENT)
Dept: PLASTIC SURGERY | Facility: CLINIC | Age: 77
End: 2017-09-12
Payer: MEDICARE

## 2017-09-12 PROCEDURE — 99024 POSTOP FOLLOW-UP VISIT: CPT

## 2017-10-02 ENCOUNTER — APPOINTMENT (OUTPATIENT)
Dept: DERMATOLOGY | Facility: CLINIC | Age: 77
End: 2017-10-02
Payer: COMMERCIAL

## 2017-10-02 PROCEDURE — 11900 INJECT SKIN LESIONS </W 7: CPT

## 2017-10-02 PROCEDURE — 99214 OFFICE O/P EST MOD 30 MIN: CPT | Mod: 25

## 2017-12-19 ENCOUNTER — APPOINTMENT (OUTPATIENT)
Dept: PLASTIC SURGERY | Facility: CLINIC | Age: 77
End: 2017-12-19
Payer: MEDICARE

## 2017-12-19 PROCEDURE — 99213 OFFICE O/P EST LOW 20 MIN: CPT

## 2018-03-27 ENCOUNTER — RESULT REVIEW (OUTPATIENT)
Age: 78
End: 2018-03-27

## 2018-04-02 ENCOUNTER — APPOINTMENT (OUTPATIENT)
Dept: DERMATOLOGY | Facility: CLINIC | Age: 78
End: 2018-04-02

## 2018-05-15 ENCOUNTER — APPOINTMENT (OUTPATIENT)
Dept: THORACIC SURGERY | Facility: CLINIC | Age: 78
End: 2018-05-15
Payer: COMMERCIAL

## 2018-05-15 VITALS
HEART RATE: 102 BPM | HEIGHT: 61 IN | SYSTOLIC BLOOD PRESSURE: 126 MMHG | BODY MASS INDEX: 20.2 KG/M2 | DIASTOLIC BLOOD PRESSURE: 74 MMHG | WEIGHT: 107 LBS | OXYGEN SATURATION: 98 % | RESPIRATION RATE: 16 BRPM

## 2018-05-15 DIAGNOSIS — R59.0 LOCALIZED ENLARGED LYMPH NODES: ICD-10-CM

## 2018-05-15 DIAGNOSIS — C44.319 BASAL CELL CARCINOMA OF SKIN OF OTHER PARTS OF FACE: ICD-10-CM

## 2018-05-15 DIAGNOSIS — C44.311 BASAL CELL CARCINOMA OF SKIN OF NOSE: ICD-10-CM

## 2018-05-15 DIAGNOSIS — Z80.1 FAMILY HISTORY OF MALIGNANT NEOPLASM OF TRACHEA, BRONCHUS AND LUNG: ICD-10-CM

## 2018-05-15 DIAGNOSIS — D48.5 NEOPLASM OF UNCERTAIN BEHAVIOR OF SKIN: ICD-10-CM

## 2018-05-15 DIAGNOSIS — L91.0 HYPERTROPHIC SCAR: ICD-10-CM

## 2018-05-15 DIAGNOSIS — R91.8 OTHER NONSPECIFIC ABNORMAL FINDING OF LUNG FIELD: ICD-10-CM

## 2018-05-15 DIAGNOSIS — Z85.828 PERSONAL HISTORY OF OTHER MALIGNANT NEOPLASM OF SKIN: ICD-10-CM

## 2018-05-15 DIAGNOSIS — Z87.09 PERSONAL HISTORY OF OTHER DISEASES OF THE RESPIRATORY SYSTEM: ICD-10-CM

## 2018-05-15 DIAGNOSIS — L82.1 OTHER SEBORRHEIC KERATOSIS: ICD-10-CM

## 2018-05-15 DIAGNOSIS — L67.9 HAIR COLOR AND HAIR SHAFT ABNORMALITY, UNSPECIFIED: ICD-10-CM

## 2018-05-15 DIAGNOSIS — Z87.891 PERSONAL HISTORY OF NICOTINE DEPENDENCE: ICD-10-CM

## 2018-05-15 DIAGNOSIS — L30.9 DERMATITIS, UNSPECIFIED: ICD-10-CM

## 2018-05-15 DIAGNOSIS — Z12.83 ENCOUNTER FOR SCREENING FOR MALIGNANT NEOPLASM OF SKIN: ICD-10-CM

## 2018-05-15 DIAGNOSIS — D18.01 HEMANGIOMA OF SKIN AND SUBCUTANEOUS TISSUE: ICD-10-CM

## 2018-05-15 DIAGNOSIS — L73.8 OTHER SPECIFIED FOLLICULAR DISORDERS: ICD-10-CM

## 2018-05-15 PROCEDURE — 99205 OFFICE O/P NEW HI 60 MIN: CPT

## 2018-05-17 PROBLEM — L91.0 HYPERTROPHIC SCAR: Status: ACTIVE | Noted: 2017-10-02

## 2018-05-17 PROBLEM — L67.9 UNWANTED HAIR: Status: ACTIVE | Noted: 2017-05-30

## 2018-05-17 PROBLEM — L73.8 SEBACEOUS HYPERPLASIA OF FACE: Status: ACTIVE | Noted: 2017-05-30

## 2018-05-17 PROBLEM — Z87.09 HISTORY OF PULMONARY EMPHYSEMA: Status: RESOLVED | Noted: 2018-05-17 | Resolved: 2018-05-17

## 2018-05-17 PROBLEM — Z80.1 FAMILY HISTORY OF LUNG CANCER: Status: ACTIVE | Noted: 2018-05-17

## 2018-05-17 PROBLEM — D48.5 NEOPLASM OF UNCERTAIN BEHAVIOR OF SKIN: Status: ACTIVE | Noted: 2017-05-30

## 2018-05-17 PROBLEM — Z87.891 FORMER SMOKER: Status: ACTIVE | Noted: 2018-05-17

## 2018-05-17 PROBLEM — L30.9 ECZEMA: Status: ACTIVE | Noted: 2017-10-02

## 2018-05-17 PROBLEM — Z12.83 SCREENING EXAM FOR SKIN CANCER: Status: ACTIVE | Noted: 2017-10-02

## 2018-05-17 PROBLEM — D18.01 CHERRY ANGIOMA: Status: ACTIVE | Noted: 2017-05-30

## 2018-05-17 PROBLEM — L82.1 OTHER SEBORRHEIC KERATOSIS: Status: ACTIVE | Noted: 2017-05-30

## 2018-05-17 RX ORDER — CIPROFLOXACIN HYDROCHLORIDE 250 MG/1
250 TABLET, FILM COATED ORAL
Qty: 10 | Refills: 0 | Status: COMPLETED | COMMUNITY
Start: 2018-02-07

## 2018-05-17 RX ORDER — CLINDAMYCIN HYDROCHLORIDE 300 MG/1
300 CAPSULE ORAL
Qty: 12 | Refills: 0 | Status: COMPLETED | COMMUNITY
Start: 2017-03-29 | End: 2018-05-17

## 2018-05-17 RX ORDER — EZETIMIBE 10 MG/1
10 TABLET ORAL
Qty: 30 | Refills: 0 | Status: COMPLETED | COMMUNITY
Start: 2018-02-08

## 2018-05-17 RX ORDER — IRON SUCROSE 20 MG/ML
20 INJECTION, SOLUTION INTRAVENOUS
Qty: 20 | Refills: 0 | Status: COMPLETED | COMMUNITY
Start: 2017-01-22 | End: 2018-05-17

## 2018-05-17 RX ORDER — MECLIZINE HYDROCHLORIDE 12.5 MG/1
12.5 TABLET ORAL
Qty: 40 | Refills: 0 | Status: COMPLETED | COMMUNITY
Start: 2018-05-09

## 2018-05-17 RX ORDER — TRIAMCINOLONE ACETONIDE 1 MG/G
0.1 OINTMENT TOPICAL
Qty: 1 | Refills: 1 | Status: COMPLETED | COMMUNITY
Start: 2017-10-02 | End: 2018-05-17

## 2018-05-17 RX ORDER — AZITHROMYCIN 250 MG/1
250 TABLET, FILM COATED ORAL
Qty: 6 | Refills: 0 | Status: COMPLETED | COMMUNITY
Start: 2018-01-25

## 2018-05-17 RX ORDER — SODIUM CHLORIDE 9 G/ML
0.9 INJECTION, SOLUTION INTRAVENOUS
Qty: 200 | Refills: 0 | Status: COMPLETED | COMMUNITY
Start: 2017-01-25 | End: 2018-05-17

## 2018-05-17 RX ORDER — EFLORNITHINE HYDROCHLORIDE 139 MG/G
13.9 CREAM TOPICAL TWICE DAILY
Qty: 1 | Refills: 2 | Status: COMPLETED | COMMUNITY
Start: 2017-05-30 | End: 2018-05-17

## 2018-05-17 RX ORDER — GABAPENTIN 300 MG/1
300 CAPSULE ORAL
Qty: 60 | Refills: 0 | Status: COMPLETED | COMMUNITY
Start: 2016-09-14 | End: 2018-05-17

## 2018-05-17 RX ORDER — CIPROFLOXACIN HYDROCHLORIDE 500 MG/1
500 TABLET, FILM COATED ORAL
Qty: 20 | Refills: 0 | Status: COMPLETED | COMMUNITY
Start: 2016-12-16 | End: 2018-05-17

## 2018-05-17 RX ORDER — OXYCODONE AND ACETAMINOPHEN 5; 325 MG/1; MG/1
5-325 TABLET ORAL
Qty: 30 | Refills: 0 | Status: COMPLETED | COMMUNITY
Start: 2017-03-29 | End: 2018-05-17

## 2018-05-23 ENCOUNTER — OUTPATIENT (OUTPATIENT)
Dept: OUTPATIENT SERVICES | Facility: HOSPITAL | Age: 78
LOS: 1 days | End: 2018-05-23

## 2018-05-23 VITALS
DIASTOLIC BLOOD PRESSURE: 70 MMHG | HEIGHT: 60 IN | TEMPERATURE: 98 F | RESPIRATION RATE: 16 BRPM | OXYGEN SATURATION: 96 % | WEIGHT: 104.94 LBS | SYSTOLIC BLOOD PRESSURE: 120 MMHG | HEART RATE: 70 BPM

## 2018-05-23 DIAGNOSIS — R59.0 LOCALIZED ENLARGED LYMPH NODES: ICD-10-CM

## 2018-05-23 DIAGNOSIS — H26.9 UNSPECIFIED CATARACT: Chronic | ICD-10-CM

## 2018-05-23 DIAGNOSIS — Z98.890 OTHER SPECIFIED POSTPROCEDURAL STATES: Chronic | ICD-10-CM

## 2018-05-23 DIAGNOSIS — J44.9 CHRONIC OBSTRUCTIVE PULMONARY DISEASE, UNSPECIFIED: ICD-10-CM

## 2018-05-23 DIAGNOSIS — F32.9 MAJOR DEPRESSIVE DISORDER, SINGLE EPISODE, UNSPECIFIED: ICD-10-CM

## 2018-05-23 DIAGNOSIS — E03.9 HYPOTHYROIDISM, UNSPECIFIED: ICD-10-CM

## 2018-05-23 DIAGNOSIS — I10 ESSENTIAL (PRIMARY) HYPERTENSION: ICD-10-CM

## 2018-05-23 DIAGNOSIS — Z85.828 PERSONAL HISTORY OF OTHER MALIGNANT NEOPLASM OF SKIN: Chronic | ICD-10-CM

## 2018-05-23 DIAGNOSIS — Z90.12 ACQUIRED ABSENCE OF LEFT BREAST AND NIPPLE: Chronic | ICD-10-CM

## 2018-05-23 DIAGNOSIS — D64.9 ANEMIA, UNSPECIFIED: ICD-10-CM

## 2018-05-23 DIAGNOSIS — Z94.5 SKIN TRANSPLANT STATUS: Chronic | ICD-10-CM

## 2018-05-23 DIAGNOSIS — Z90.710 ACQUIRED ABSENCE OF BOTH CERVIX AND UTERUS: Chronic | ICD-10-CM

## 2018-05-23 LAB
BLD GP AB SCN SERPL QL: NEGATIVE — SIGNIFICANT CHANGE UP
BUN SERPL-MCNC: 9 MG/DL — SIGNIFICANT CHANGE UP (ref 7–23)
CALCIUM SERPL-MCNC: 9.3 MG/DL — SIGNIFICANT CHANGE UP (ref 8.4–10.5)
CHLORIDE SERPL-SCNC: 98 MMOL/L — SIGNIFICANT CHANGE UP (ref 98–107)
CO2 SERPL-SCNC: 26 MMOL/L — SIGNIFICANT CHANGE UP (ref 22–31)
CREAT SERPL-MCNC: 0.78 MG/DL — SIGNIFICANT CHANGE UP (ref 0.5–1.3)
GLUCOSE SERPL-MCNC: 92 MG/DL — SIGNIFICANT CHANGE UP (ref 70–99)
HCT VFR BLD CALC: 26.8 % — LOW (ref 34.5–45)
HGB BLD-MCNC: 7.9 G/DL — LOW (ref 11.5–15.5)
MCHC RBC-ENTMCNC: 24.2 PG — LOW (ref 27–34)
MCHC RBC-ENTMCNC: 29.5 % — LOW (ref 32–36)
MCV RBC AUTO: 82 FL — SIGNIFICANT CHANGE UP (ref 80–100)
NRBC # FLD: 0 — SIGNIFICANT CHANGE UP
PLATELET # BLD AUTO: 552 K/UL — HIGH (ref 150–400)
PMV BLD: 8.4 FL — SIGNIFICANT CHANGE UP (ref 7–13)
POTASSIUM SERPL-MCNC: 4.3 MMOL/L — SIGNIFICANT CHANGE UP (ref 3.5–5.3)
POTASSIUM SERPL-SCNC: 4.3 MMOL/L — SIGNIFICANT CHANGE UP (ref 3.5–5.3)
RBC # BLD: 3.27 M/UL — LOW (ref 3.8–5.2)
RBC # FLD: 17.2 % — HIGH (ref 10.3–14.5)
RH IG SCN BLD-IMP: POSITIVE — SIGNIFICANT CHANGE UP
SODIUM SERPL-SCNC: 136 MMOL/L — SIGNIFICANT CHANGE UP (ref 135–145)
WBC # BLD: 8.77 K/UL — SIGNIFICANT CHANGE UP (ref 3.8–10.5)
WBC # FLD AUTO: 8.77 K/UL — SIGNIFICANT CHANGE UP (ref 3.8–10.5)

## 2018-05-23 RX ORDER — GABAPENTIN 400 MG/1
1 CAPSULE ORAL
Qty: 0 | Refills: 0 | COMMUNITY

## 2018-05-23 RX ORDER — AMLODIPINE BESYLATE 2.5 MG/1
1 TABLET ORAL
Qty: 0 | Refills: 0 | COMMUNITY

## 2018-05-23 RX ORDER — BUDESONIDE AND FORMOTEROL FUMARATE DIHYDRATE 160; 4.5 UG/1; UG/1
2 AEROSOL RESPIRATORY (INHALATION)
Qty: 0 | Refills: 0 | COMMUNITY

## 2018-05-23 NOTE — H&P PST ADULT - HISTORY OF PRESENT ILLNESS
76 y/o female with PMH of HTN, Hypothyroidism, Depression and personal history of beast CA s/p lumpectomy and radiation, SCC of  upper lip and basal cell CA of nose, Iron Deficiency Anemia and COPD presents to PST for preoperative evaluation with dx of localized enlarged lymph nodes. Pt presented to [facility] for a amenia work up. Set for a CT scan of chest on  5/14/18 which noted bilateral lung mass. Scheduled for  At present, pt reports SOB on moderate exertion chronic dry cough, fatigue and dysphagia. 76 y/o female with PMH of HTN, Hypothyroidism, Depression and personal history of beast CA s/p lumpectomy and radiation, SCC of  upper lip and basal cell CA of nose, Iron Deficiency Anemia and COPD presents to PST for preoperative evaluation with dx of localized enlarged lymph nodes. Pt presented to [facility] for a amenia work up. Sent for a CT scan of chest on 5/14/18 which noted bilateral lung mass. Scheduled for Flexible Bronchoscopy, Endobronchial Ultrasound  Guided Biopsy, Possible Mediastinoscopy on 5/31/2018   At present, pt reports SOB on moderate exercise, chronic dry cough, fatigue and dysphagia.

## 2018-05-23 NOTE — H&P PST ADULT - PSH
Bilateral cataracts  with lens insertion  H/O total hysterectomy    History of Mohs surgery for squamous cell carcinoma of skin  2/2017  S/P inguinal hernia repair    S/P lumpectomy, left breast  1995 & RADS  Status post skin flap graft  nasal reconstruction, costal cartilage graft, flap revision   total of 4 reconstructive surgeries in 2017, last one in 8/2017

## 2018-05-23 NOTE — H&P PST ADULT - DENTITION
full top and partial lower dentures. Denies loose teeth normal/full top and partial lower dentures. Denies loose teeth

## 2018-05-23 NOTE — H&P PST ADULT - NEGATIVE BREAST SYMPTOMS
no breast tenderness R/no breast lump R/no nipple discharge R/no breast tenderness L/no breast lump L/no nipple discharge L

## 2018-05-23 NOTE — H&P PST ADULT - NEGATIVE ENMT SYMPTOMS
no vertigo/no ear pain/no tinnitus/no hearing difficulty/no gum bleeding/no throat pain/no post-nasal discharge/no nose bleeds

## 2018-05-23 NOTE — H&P PST ADULT - PROBLEM SELECTOR PLAN 1
Scheduled for Flexible Bronchoscopy, Endobronchial Ultrasound  Guided Biopsy, Possible Mediastinoscopy on 5/31/2018   Preop instructions given, pt verbalized understanding   Chlorhexidine wash and GI prophylaxis provided  medical evaluation with attached EKG on chart

## 2018-05-23 NOTE — H&P PST ADULT - NEGATIVE NEUROLOGICAL SYMPTOMS
no paresthesias/no headache/no confusion/no transient paralysis/no focal seizures/no loss of sensation/no weakness/no generalized seizures/no tremors/no syncope/no vertigo/no loss of consciousness/no hemiparesis

## 2018-05-23 NOTE — H&P PST ADULT - PMH
Acute deep vein thrombosis (DVT) of lower extremity, unspecified laterality, unspecified vein  left leg 10/2016 on Warfarin until 6/17  Anemia  on iron infusions  BCC (basal cell carcinoma of skin)  s/p MOHS  Chronic obstructive pulmonary disease, unspecified COPD type  Pt was never hospitalized  for COPD  Depression    Essential hypertension    Glaucoma    Hypothyroidism, unspecified type    Localized enlarged lymph nodes    Spinal stenosis, unspecified spinal region

## 2018-05-23 NOTE — H&P PST ADULT - NEGATIVE GENERAL GENITOURINARY SYMPTOMS
no flank pain R/no hematuria/no incontinence/no flank pain L/no bladder infections/no dysuria/no urinary hesitancy

## 2018-05-23 NOTE — H&P PST ADULT - MALLAMPATI CLASS
Class IV (difficult) - the soft palate is not visible at all Class III - visualization of the soft palate and the base of the uvula

## 2018-05-30 RX ORDER — LOSARTAN POTASSIUM 100 MG/1
1 TABLET, FILM COATED ORAL
Qty: 0 | Refills: 0 | COMMUNITY

## 2018-05-31 ENCOUNTER — APPOINTMENT (OUTPATIENT)
Dept: THORACIC SURGERY | Facility: HOSPITAL | Age: 78
End: 2018-05-31

## 2018-06-11 ENCOUNTER — FORM ENCOUNTER (OUTPATIENT)
Age: 78
End: 2018-06-11

## 2018-06-12 ENCOUNTER — OUTPATIENT (OUTPATIENT)
Dept: OUTPATIENT SERVICES | Facility: HOSPITAL | Age: 78
LOS: 1 days | Discharge: HOME | End: 2018-06-12
Payer: COMMERCIAL

## 2018-06-12 ENCOUNTER — APPOINTMENT (OUTPATIENT)
Dept: THORACIC SURGERY | Facility: HOSPITAL | Age: 78
End: 2018-06-12

## 2018-06-12 ENCOUNTER — RESULT REVIEW (OUTPATIENT)
Age: 78
End: 2018-06-12

## 2018-06-12 VITALS
SYSTOLIC BLOOD PRESSURE: 108 MMHG | HEIGHT: 60 IN | HEART RATE: 59 BPM | RESPIRATION RATE: 16 BRPM | WEIGHT: 104.94 LBS | TEMPERATURE: 98 F | DIASTOLIC BLOOD PRESSURE: 56 MMHG | OXYGEN SATURATION: 99 %

## 2018-06-12 VITALS
RESPIRATION RATE: 16 BRPM | OXYGEN SATURATION: 100 % | DIASTOLIC BLOOD PRESSURE: 50 MMHG | SYSTOLIC BLOOD PRESSURE: 124 MMHG | HEART RATE: 57 BPM

## 2018-06-12 DIAGNOSIS — Z94.5 SKIN TRANSPLANT STATUS: Chronic | ICD-10-CM

## 2018-06-12 DIAGNOSIS — Z85.828 PERSONAL HISTORY OF OTHER MALIGNANT NEOPLASM OF SKIN: Chronic | ICD-10-CM

## 2018-06-12 DIAGNOSIS — H26.9 UNSPECIFIED CATARACT: Chronic | ICD-10-CM

## 2018-06-12 DIAGNOSIS — R59.0 LOCALIZED ENLARGED LYMPH NODES: ICD-10-CM

## 2018-06-12 DIAGNOSIS — Z90.710 ACQUIRED ABSENCE OF BOTH CERVIX AND UTERUS: Chronic | ICD-10-CM

## 2018-06-12 DIAGNOSIS — Z98.890 OTHER SPECIFIED POSTPROCEDURAL STATES: Chronic | ICD-10-CM

## 2018-06-12 DIAGNOSIS — Z90.12 ACQUIRED ABSENCE OF LEFT BREAST AND NIPPLE: Chronic | ICD-10-CM

## 2018-06-12 PROCEDURE — 31624 DX BRONCHOSCOPE/LAVAGE: CPT

## 2018-06-12 PROCEDURE — 88305 TISSUE EXAM BY PATHOLOGIST: CPT | Mod: 26

## 2018-06-12 PROCEDURE — 31629 BRONCHOSCOPY/NEEDLE BX EACH: CPT

## 2018-06-12 PROCEDURE — 88305 TISSUE EXAM BY PATHOLOGIST: CPT | Mod: 26,59

## 2018-06-12 PROCEDURE — 88342 IMHCHEM/IMCYTCHM 1ST ANTB: CPT | Mod: 26

## 2018-06-12 PROCEDURE — 88173 CYTOPATH EVAL FNA REPORT: CPT | Mod: 26

## 2018-06-12 PROCEDURE — 31645 BRNCHSC W/THER ASPIR 1ST: CPT

## 2018-06-12 PROCEDURE — 31625 BRONCHOSCOPY W/BIOPSY(S): CPT | Mod: 59

## 2018-06-12 PROCEDURE — 31652 BRONCH EBUS SAMPLNG 1/2 NODE: CPT

## 2018-06-12 PROCEDURE — 88172 CYTP DX EVAL FNA 1ST EA SITE: CPT | Mod: 26

## 2018-06-12 PROCEDURE — 88341 IMHCHEM/IMCYTCHM EA ADD ANTB: CPT | Mod: 26

## 2018-06-12 PROCEDURE — 71045 X-RAY EXAM CHEST 1 VIEW: CPT | Mod: 26

## 2018-06-12 RX ORDER — FENTANYL CITRATE 50 UG/ML
50 INJECTION INTRAVENOUS
Qty: 0 | Refills: 0 | Status: DISCONTINUED | OUTPATIENT
Start: 2018-06-12 | End: 2018-06-12

## 2018-06-12 RX ORDER — ONDANSETRON 8 MG/1
4 TABLET, FILM COATED ORAL ONCE
Qty: 0 | Refills: 0 | Status: DISCONTINUED | OUTPATIENT
Start: 2018-06-12 | End: 2018-06-12

## 2018-06-12 RX ORDER — FENTANYL CITRATE 50 UG/ML
25 INJECTION INTRAVENOUS
Qty: 0 | Refills: 0 | Status: DISCONTINUED | OUTPATIENT
Start: 2018-06-12 | End: 2018-06-12

## 2018-06-12 NOTE — ASU DISCHARGE PLAN (ADULT/PEDIATRIC). - MEDICATION SUMMARY - MEDICATIONS TO TAKE
I will START or STAY ON the medications listed below when I get home from the hospital:    losartan 100 mg oral tablet  -- 1 tab(s) by mouth once a day noon  -- Indication: For blood pressure    Paxil CR 25 mg oral tablet, extended release  -- 1 tab(s) by mouth once a day (noon)  -- Indication: For depression    Symbicort 160 mcg-4.5 mcg/inh inhalation aerosol  -- 2 puff(s) inhaled 2 times a day  -- Indication: For COPD    amLODIPine 5 mg oral tablet  -- 1 tab(s) by mouth once a day  -- Indication: For blood pressure    Synthroid 50 mcg (0.05 mg) oral tablet  -- 1 tab(s) by mouth once a day am  -- Indication: For hypothyroidism

## 2018-06-12 NOTE — ASU DISCHARGE PLAN (ADULT/PEDIATRIC). - SPECIAL INSTRUCTIONS
You were given tyelnol in the operating room, so you may not take any tyelnol product until _______8: 28pm____

## 2018-06-12 NOTE — ASU DISCHARGE PLAN (ADULT/PEDIATRIC). - NOTIFY
Swelling that continues/shortness of breath, it is normal to cough up small amounts of blood tinged sputum/Bleeding that does not stop shortness of breath, it is normal to cough up small amounts of blood tinged sputum/Fever greater than 101/Bleeding that does not stop/Swelling that continues/Pain not relieved by Medications/Persistent Nausea and Vomiting

## 2018-06-12 NOTE — ASU DISCHARGE PLAN (ADULT/PEDIATRIC). - COMMENTS
call  PACU  [phone] ( open  24 hour  and weekend ) , [phone] ( open 6AM to 11 PM  closed weekend)

## 2018-06-12 NOTE — ASU DISCHARGE PLAN (ADULT/PEDIATRIC). - ACTIVITY LEVEL
nothing strenuous for 2 days no heavy lifting/no sports/gym/no exercise/nothing strenuous for 2 days

## 2018-06-12 NOTE — BRIEF OPERATIVE NOTE - PROCEDURE
<<-----Click on this checkbox to enter Procedure Bronchoscopy  06/12/2018  Endobronchial Biopsy RMl and RLL, EBUS, TBNA level 7  Active  JSCARTOVERÓNICA

## 2018-06-28 ENCOUNTER — INPATIENT (INPATIENT)
Facility: HOSPITAL | Age: 78
LOS: 7 days | Discharge: HOME | DRG: 54 | End: 2018-07-06
Attending: FAMILY MEDICINE | Admitting: FAMILY MEDICINE
Payer: COMMERCIAL

## 2018-06-28 VITALS
DIASTOLIC BLOOD PRESSURE: 71 MMHG | SYSTOLIC BLOOD PRESSURE: 115 MMHG | OXYGEN SATURATION: 95 % | HEART RATE: 82 BPM | WEIGHT: 102.07 LBS | TEMPERATURE: 98 F | RESPIRATION RATE: 17 BRPM

## 2018-06-28 DIAGNOSIS — I10 ESSENTIAL (PRIMARY) HYPERTENSION: ICD-10-CM

## 2018-06-28 DIAGNOSIS — C79.31 SECONDARY MALIGNANT NEOPLASM OF BRAIN: ICD-10-CM

## 2018-06-28 DIAGNOSIS — Z94.5 SKIN TRANSPLANT STATUS: Chronic | ICD-10-CM

## 2018-06-28 DIAGNOSIS — R53.1 WEAKNESS: ICD-10-CM

## 2018-06-28 DIAGNOSIS — Z90.12 ACQUIRED ABSENCE OF LEFT BREAST AND NIPPLE: Chronic | ICD-10-CM

## 2018-06-28 DIAGNOSIS — H26.9 UNSPECIFIED CATARACT: Chronic | ICD-10-CM

## 2018-06-28 DIAGNOSIS — E87.5 HYPERKALEMIA: ICD-10-CM

## 2018-06-28 DIAGNOSIS — M79.642 PAIN IN LEFT HAND: ICD-10-CM

## 2018-06-28 DIAGNOSIS — E03.9 HYPOTHYROIDISM, UNSPECIFIED: ICD-10-CM

## 2018-06-28 DIAGNOSIS — R29.6 REPEATED FALLS: ICD-10-CM

## 2018-06-28 DIAGNOSIS — Z85.828 PERSONAL HISTORY OF OTHER MALIGNANT NEOPLASM OF SKIN: Chronic | ICD-10-CM

## 2018-06-28 DIAGNOSIS — Z98.890 OTHER SPECIFIED POSTPROCEDURAL STATES: Chronic | ICD-10-CM

## 2018-06-28 DIAGNOSIS — Z29.9 ENCOUNTER FOR PROPHYLACTIC MEASURES, UNSPECIFIED: ICD-10-CM

## 2018-06-28 DIAGNOSIS — F32.9 MAJOR DEPRESSIVE DISORDER, SINGLE EPISODE, UNSPECIFIED: ICD-10-CM

## 2018-06-28 DIAGNOSIS — Z90.710 ACQUIRED ABSENCE OF BOTH CERVIX AND UTERUS: Chronic | ICD-10-CM

## 2018-06-28 LAB
ALBUMIN SERPL ELPH-MCNC: 3.1 G/DL — LOW (ref 3.3–5)
ALP SERPL-CCNC: 90 U/L — SIGNIFICANT CHANGE UP (ref 40–120)
ALT FLD-CCNC: 14 U/L — SIGNIFICANT CHANGE UP (ref 10–45)
ANION GAP SERPL CALC-SCNC: 13 MMOL/L — SIGNIFICANT CHANGE UP (ref 5–17)
APTT BLD: 26.6 SEC — LOW (ref 27.5–37.4)
AST SERPL-CCNC: 45 U/L — HIGH (ref 10–40)
BASOPHILS # BLD AUTO: 0 K/UL — SIGNIFICANT CHANGE UP (ref 0–0.2)
BASOPHILS NFR BLD AUTO: 0.3 % — SIGNIFICANT CHANGE UP (ref 0–2)
BILIRUB SERPL-MCNC: 0.3 MG/DL — SIGNIFICANT CHANGE UP (ref 0.2–1.2)
BUN SERPL-MCNC: 11 MG/DL — SIGNIFICANT CHANGE UP (ref 7–23)
CALCIUM SERPL-MCNC: 9.2 MG/DL — SIGNIFICANT CHANGE UP (ref 8.4–10.5)
CHLORIDE SERPL-SCNC: 98 MMOL/L — SIGNIFICANT CHANGE UP (ref 96–108)
CO2 SERPL-SCNC: 22 MMOL/L — SIGNIFICANT CHANGE UP (ref 22–31)
CREAT SERPL-MCNC: 0.64 MG/DL — SIGNIFICANT CHANGE UP (ref 0.5–1.3)
EOSINOPHIL # BLD AUTO: 0.1 K/UL — SIGNIFICANT CHANGE UP (ref 0–0.5)
EOSINOPHIL NFR BLD AUTO: 0.9 % — SIGNIFICANT CHANGE UP (ref 0–6)
GLUCOSE SERPL-MCNC: 99 MG/DL — SIGNIFICANT CHANGE UP (ref 70–99)
HCT VFR BLD CALC: 31.2 % — LOW (ref 34.5–45)
HGB BLD-MCNC: 10 G/DL — LOW (ref 11.5–15.5)
INR BLD: 1.06 RATIO — SIGNIFICANT CHANGE UP (ref 0.88–1.16)
LYMPHOCYTES # BLD AUTO: 0.8 K/UL — LOW (ref 1–3.3)
LYMPHOCYTES # BLD AUTO: 10.8 % — LOW (ref 13–44)
MCHC RBC-ENTMCNC: 26.2 PG — LOW (ref 27–34)
MCHC RBC-ENTMCNC: 32.1 GM/DL — SIGNIFICANT CHANGE UP (ref 32–36)
MCV RBC AUTO: 81.8 FL — SIGNIFICANT CHANGE UP (ref 80–100)
MONOCYTES # BLD AUTO: 0.5 K/UL — SIGNIFICANT CHANGE UP (ref 0–0.9)
MONOCYTES NFR BLD AUTO: 6 % — SIGNIFICANT CHANGE UP (ref 2–14)
NEUTROPHILS # BLD AUTO: 6.4 K/UL — SIGNIFICANT CHANGE UP (ref 1.8–7.4)
NEUTROPHILS NFR BLD AUTO: 82 % — HIGH (ref 43–77)
PLATELET # BLD AUTO: 412 K/UL — HIGH (ref 150–400)
POTASSIUM SERPL-MCNC: 4.3 MMOL/L — SIGNIFICANT CHANGE UP (ref 3.5–5.3)
POTASSIUM SERPL-MCNC: 5.8 MMOL/L — HIGH (ref 3.5–5.3)
POTASSIUM SERPL-SCNC: 4.3 MMOL/L — SIGNIFICANT CHANGE UP (ref 3.5–5.3)
POTASSIUM SERPL-SCNC: 5.8 MMOL/L — HIGH (ref 3.5–5.3)
PROT SERPL-MCNC: 6.8 G/DL — SIGNIFICANT CHANGE UP (ref 6–8.3)
PROTHROM AB SERPL-ACNC: 11.6 SEC — SIGNIFICANT CHANGE UP (ref 9.8–12.7)
RBC # BLD: 3.81 M/UL — SIGNIFICANT CHANGE UP (ref 3.8–5.2)
RBC # FLD: 17.2 % — HIGH (ref 10.3–14.5)
SODIUM SERPL-SCNC: 133 MMOL/L — LOW (ref 135–145)
WBC # BLD: 7.8 K/UL — SIGNIFICANT CHANGE UP (ref 3.8–10.5)
WBC # FLD AUTO: 7.8 K/UL — SIGNIFICANT CHANGE UP (ref 3.8–10.5)

## 2018-06-28 PROCEDURE — 73110 X-RAY EXAM OF WRIST: CPT | Mod: 26,LT

## 2018-06-28 PROCEDURE — 99285 EMERGENCY DEPT VISIT HI MDM: CPT

## 2018-06-28 PROCEDURE — 72131 CT LUMBAR SPINE W/O DYE: CPT | Mod: 26

## 2018-06-28 PROCEDURE — 71045 X-RAY EXAM CHEST 1 VIEW: CPT | Mod: 26

## 2018-06-28 PROCEDURE — 70450 CT HEAD/BRAIN W/O DYE: CPT | Mod: 26

## 2018-06-28 PROCEDURE — 99223 1ST HOSP IP/OBS HIGH 75: CPT

## 2018-06-28 PROCEDURE — 73130 X-RAY EXAM OF HAND: CPT | Mod: 26,LT

## 2018-06-28 RX ORDER — MULTIVIT-MIN/FERROUS GLUCONATE 9 MG/15 ML
1 LIQUID (ML) ORAL DAILY
Qty: 0 | Refills: 0 | Status: DISCONTINUED | OUTPATIENT
Start: 2018-06-28 | End: 2018-07-06

## 2018-06-28 RX ORDER — MORPHINE SULFATE 50 MG/1
2 CAPSULE, EXTENDED RELEASE ORAL ONCE
Qty: 0 | Refills: 0 | Status: DISCONTINUED | OUTPATIENT
Start: 2018-06-28 | End: 2018-06-28

## 2018-06-28 RX ORDER — HEPARIN SODIUM 5000 [USP'U]/ML
5000 INJECTION INTRAVENOUS; SUBCUTANEOUS EVERY 8 HOURS
Qty: 0 | Refills: 0 | Status: DISCONTINUED | OUTPATIENT
Start: 2018-06-28 | End: 2018-07-06

## 2018-06-28 RX ORDER — LOSARTAN POTASSIUM 100 MG/1
100 TABLET, FILM COATED ORAL DAILY
Qty: 0 | Refills: 0 | Status: DISCONTINUED | OUTPATIENT
Start: 2018-06-28 | End: 2018-07-06

## 2018-06-28 RX ORDER — BUDESONIDE AND FORMOTEROL FUMARATE DIHYDRATE 160; 4.5 UG/1; UG/1
2 AEROSOL RESPIRATORY (INHALATION)
Qty: 0 | Refills: 0 | COMMUNITY

## 2018-06-28 RX ORDER — ACETAMINOPHEN 500 MG
1000 TABLET ORAL ONCE
Qty: 0 | Refills: 0 | Status: COMPLETED | OUTPATIENT
Start: 2018-06-28 | End: 2018-06-28

## 2018-06-28 RX ORDER — LEVOTHYROXINE SODIUM 125 MCG
1 TABLET ORAL
Qty: 0 | Refills: 0 | COMMUNITY

## 2018-06-28 RX ORDER — OXYCODONE HYDROCHLORIDE 5 MG/1
5 TABLET ORAL EVERY 6 HOURS
Qty: 0 | Refills: 0 | Status: DISCONTINUED | OUTPATIENT
Start: 2018-06-28 | End: 2018-07-03

## 2018-06-28 RX ORDER — MULTIVIT-MIN/FERROUS GLUCONATE 9 MG/15 ML
1 LIQUID (ML) ORAL
Qty: 0 | Refills: 0 | COMMUNITY

## 2018-06-28 RX ORDER — LOSARTAN POTASSIUM 100 MG/1
1 TABLET, FILM COATED ORAL
Qty: 0 | Refills: 0 | COMMUNITY

## 2018-06-28 RX ORDER — DEXAMETHASONE 0.5 MG/5ML
4 ELIXIR ORAL EVERY 6 HOURS
Qty: 0 | Refills: 0 | Status: DISCONTINUED | OUTPATIENT
Start: 2018-06-28 | End: 2018-07-04

## 2018-06-28 RX ORDER — FAMOTIDINE 10 MG/ML
1 INJECTION INTRAVENOUS
Qty: 0 | Refills: 0 | COMMUNITY

## 2018-06-28 RX ORDER — BUDESONIDE AND FORMOTEROL FUMARATE DIHYDRATE 160; 4.5 UG/1; UG/1
2 AEROSOL RESPIRATORY (INHALATION)
Qty: 0 | Refills: 0 | Status: DISCONTINUED | OUTPATIENT
Start: 2018-06-28 | End: 2018-07-06

## 2018-06-28 RX ORDER — LEVOTHYROXINE SODIUM 125 MCG
50 TABLET ORAL DAILY
Qty: 0 | Refills: 0 | Status: DISCONTINUED | OUTPATIENT
Start: 2018-06-28 | End: 2018-07-06

## 2018-06-28 RX ORDER — DEXAMETHASONE 0.5 MG/5ML
4 ELIXIR ORAL ONCE
Qty: 0 | Refills: 0 | Status: DISCONTINUED | OUTPATIENT
Start: 2018-06-28 | End: 2018-06-28

## 2018-06-28 RX ORDER — DEXAMETHASONE 0.5 MG/5ML
10 ELIXIR ORAL ONCE
Qty: 0 | Refills: 0 | Status: COMPLETED | OUTPATIENT
Start: 2018-06-28 | End: 2018-06-28

## 2018-06-28 RX ORDER — AMLODIPINE BESYLATE 2.5 MG/1
1 TABLET ORAL
Qty: 0 | Refills: 0 | COMMUNITY

## 2018-06-28 RX ORDER — FAMOTIDINE 10 MG/ML
20 INJECTION INTRAVENOUS DAILY
Qty: 0 | Refills: 0 | Status: DISCONTINUED | OUTPATIENT
Start: 2018-06-28 | End: 2018-07-06

## 2018-06-28 RX ORDER — AMLODIPINE BESYLATE 2.5 MG/1
5 TABLET ORAL DAILY
Qty: 0 | Refills: 0 | Status: DISCONTINUED | OUTPATIENT
Start: 2018-06-28 | End: 2018-07-06

## 2018-06-28 RX ADMIN — OXYCODONE HYDROCHLORIDE 5 MILLIGRAM(S): 5 TABLET ORAL at 21:05

## 2018-06-28 RX ADMIN — MORPHINE SULFATE 2 MILLIGRAM(S): 50 CAPSULE, EXTENDED RELEASE ORAL at 17:45

## 2018-06-28 RX ADMIN — BUDESONIDE AND FORMOTEROL FUMARATE DIHYDRATE 2 PUFF(S): 160; 4.5 AEROSOL RESPIRATORY (INHALATION) at 21:14

## 2018-06-28 RX ADMIN — HEPARIN SODIUM 5000 UNIT(S): 5000 INJECTION INTRAVENOUS; SUBCUTANEOUS at 21:20

## 2018-06-28 RX ADMIN — OXYCODONE HYDROCHLORIDE 5 MILLIGRAM(S): 5 TABLET ORAL at 19:55

## 2018-06-28 RX ADMIN — Medication 1000 MILLIGRAM(S): at 17:20

## 2018-06-28 RX ADMIN — Medication 102 MILLIGRAM(S): at 20:19

## 2018-06-28 RX ADMIN — Medication 4 MILLIGRAM(S): at 23:18

## 2018-06-28 RX ADMIN — Medication 400 MILLIGRAM(S): at 16:50

## 2018-06-28 RX ADMIN — MORPHINE SULFATE 2 MILLIGRAM(S): 50 CAPSULE, EXTENDED RELEASE ORAL at 17:15

## 2018-06-28 NOTE — H&P ADULT - PROBLEM SELECTOR PLAN 3
- s/p fall  - no fracture /dislocation on XR  - Pain control with Oxycodone IR 5 mg PO q6 severe pain; avoid stacking, hold for sedation

## 2018-06-28 NOTE — ED ADULT NURSE NOTE - CAS EDN DISCHARGE ASSESSMENT
ana advised decadron still pending (ER RN pulled into critical patient at time of bed received)/Awake/Alert and oriented to person, place and time

## 2018-06-28 NOTE — ED PROVIDER NOTE - OBJECTIVE STATEMENT
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 She also c/o left wrist pain and swelling. Pt reports that she bent down to  something and suddenly felt dizzy and fell forward hitting her head against a cabinet. She reports trying to break her fall with her left hand. Pt denies LOC. Of note she c/o generalized weakness and fatigue  over the past few weeks and last had brain irradiation about 1 week ago ( 9 of 10 rounds). She denies fever/chills, nausea/vomiting, CP, SOB, palpitations. Pt also denies sick contacts.     PMD: Dr. Jairo Rm  Onc: Dr. Carin Marin  Rad Onc: Dr. Rolf Smith

## 2018-06-28 NOTE — H&P ADULT - HISTORY OF PRESENT ILLNESS
77F with h/o recent diagnosis of metastatic lung CA, metastasis to brain, s/p brain radiation who presents with c/o weakness, falls and sacral pain. She also c/o left wrist pain and swelling. 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 She also c/o left wrist pain and swelling. 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 She also c/o left wrist pain and swelling. Pt reports that she bent down to  something and suddenly felt dizzy and fell forward hitting her head against a cabinet. She reports trying to break her fall with her left hand. Pt denies LOC. Of note she c/o generalized weakness and fatigue  over the past few weeks and last had brain irradiation about 1 week ago ( 9 of 10 rounds). She denies fever/chills, nausea/vomiting, CP, SOB, palpitations. Pt also denies sick contacts.     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 She also c/o left wrist pain and swelling. Pt reports that she bent down to  something and suddenly felt dizzy and fell forward hitting her head against a cabinet. She reports trying to break her fall with her left hand. Pt denies LOC. Of note she c/o generalized weakness and fatigue  over the past few weeks and last had brain irradiation about 1 week ago ( 9 of 10 rounds). She denies fever/chills, nausea/vomiting, CP, SOB, palpitations. Pt also denies sick contacts.     ED COURSE  VS : 115/71  82  17  O2 95% on room air T 97.9F  Labs: wbc 7.8  h/h 10/31 plt 412  Na 133 K 5.8 ( hemolysed sample) bun/Cr 11/0.64 AST 45  Albumin 3.1  Treatment : Tylenol 1g IVPB x 1, Morphine 2mg IVP x 1, Decadron 4mg IVP x 1

## 2018-06-28 NOTE — ED ADULT TRIAGE NOTE - CHIEF COMPLAINT QUOTE
pt felt weak and trip and fell into the wall hitting her left arm.  pt denies taking any blood thinners.  pt has hx of radiation tx for brain ca. pt felt weak and trip and fell into the wall hitting her left arm.  pt denies taking any blood thinners.  pt has hx of radiation tx for brain ca.  as per son, pt had weakness to BLE last night associated with numbness.

## 2018-06-28 NOTE — H&P ADULT - NSHPSOCIALHISTORY_GEN_ALL_CORE
Lives with spouse  Ambulates independently; ADL/iADL independent  Former smoker, 47 pack years ; last cigarette 12/19/2016  Denies Etoh/Illicit drug use

## 2018-06-28 NOTE — ED PROVIDER NOTE - CARE PLAN
Principal Discharge DX:	Weakness Principal Discharge DX:	Brain metastases  Secondary Diagnosis:	Falls frequently

## 2018-06-28 NOTE — H&P ADULT - ASSESSMENT
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 She also c/o left wrist pain and swelling. Physical exam is notable for left wrist tenderness, erythema and swelling. Labs are notable for   hyponatremia, hypokalemia ( hemolyzed sample), anemia, elevated AST. CT Head  shows several mass lesions with associated vasogenic edema. Left hand XR shows fracture or dislocation.

## 2018-06-28 NOTE — ED PROVIDER NOTE - PROGRESS NOTE DETAILS
Attending MD Yaya: [phone] Dr. Smith STEVE Andrea: Discussed case with oncologist Dr. Carin Marin and agreed with above plan. Will likely see patient in AM STEVE Andrea: Patient amenable to decadron. Paged Dr. Smith for recommendations regarding dosing. Awaiting call back STEVE Andrea: Dr. Rolf Smith contacted me and made aware of CT head findings. Dr. Smith recommended  loading dose of Decadron 10mgx1 and then decadron 4mgm q6 hours. Placed orders. Dr. Smith is available by phone for recommendations

## 2018-06-28 NOTE — H&P ADULT - NSHPLABSRESULTS_GEN_ALL_CORE
Labs reviewed : hyponatremia, hypokalemia ( hemolysed sample), anemia, elevated AST    CXR  personally reviewed : Clear lungs    Left hand XR personally reviewed : no fracture/dislocation    CT Head  : Several mass lesions with associated vasogenic edema are suspected one in   the left basal ganglia region one in the high right vertex region and one   in the region of the left temporal lobe. Mass effect on the frontal horn   left lateral ventricle as well as the third ventricle is appreciated.   Recommend contrast study preferably MR for more complete evaluation.     CT lumbar spine : No evidence of fracture. Mild retrolisthesis L2 relative to   L3. No evidence of osseous metastatic disease.

## 2018-06-28 NOTE — ED ADULT NURSE NOTE - OBJECTIVE STATEMENT
Pt reports that she was at home in her kitchen when she got dizzy and fell sideways onto her left side and tailbone. Pt reports that she was at home in her kitchen when she got dizzy and fell sideways into her cabinets onto her left side and then down onto her tailbone. no loc or head trauma.  No blood thinners.  States she "hasn't been walking right" for the past week and been dropping things more frequently, hx of brain cancer currently undergoing radiation, last treatment was 13 days ago, she is due for one more treatment on Monday.  Pt reports feeling weak since her fall, difficulty getting out of bed on her own, lives with her  who is independent and cares for her.  Pt with swelling and ecchymosis, to left wrist, redness to left elbow, guarding her left arm but moves it when prompted, small abrasions to right arm, +pain to tailbone but not ttp on exam, multiple scabs over forehead which she states are from the radiation.  Pt is very frail appearing, pale, thin, conversive,

## 2018-06-28 NOTE — ED ADULT NURSE NOTE - CHIEF COMPLAINT QUOTE
pt felt weak and trip and fell into the wall hitting her left arm.  pt denies taking any blood thinners.  pt has hx of radiation tx for brain ca.  as per son, pt had weakness to BLE last night associated with numbness.

## 2018-06-28 NOTE — ED PROVIDER NOTE - ATTENDING CONTRIBUTION TO CARE
Attending MD Javier:   I personally have seen and examined this patient.  Physician assistant note reviewed and agree on plan of care and except where noted.  See HPI, PE, and MDM for details.

## 2018-06-28 NOTE — H&P ADULT - PROBLEM SELECTOR PLAN 2
- in the setting of brain mets  - fall precautions  - PT evaluation  - Neurology evaluation as above

## 2018-06-28 NOTE — ED PROVIDER NOTE - MEDICAL DECISION MAKING DETAILS
Attending MD Javier: 77F ho recent dx of metastatic lung CA, mets to brain, sp brain radiation (Dr. Angelo Smith) presenting with weakness, falls and sacral pain (acute on chronic). +L wrist pain and swelling, plan for XR to ro fx, concern from Dr. Smith of worsening edema associated with brain mets causing patient's falls, will obtain CT head to eval. Will need admission given frequent falls given unsafe dispo home

## 2018-06-28 NOTE — CONSULT NOTE ADULT - ATTENDING COMMENTS
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 She also c/o left wrist pain and swelling. Pt reports that she bent down to  something and suddenly felt dizzy and fell forward hitting her head against a cabinet. Neurology consulted for brain mets seen on CT head. Exam is non-focal. Ct head showed Several mass lesions with associated vasogenic edema are suspected one in the left basal ganglia region one in the high right vertex region and one in the region of the left temporal lobe. Mass effect on the frontal horn left lateral ventricle as well as the third ventricle is appreciated.     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 Geroge neuro oncologist. oncology follow up.

## 2018-06-28 NOTE — H&P ADULT - PROBLEM SELECTOR PLAN 1
- s/p brain irradiation; has completed 9 out of 10 rounds  - CT brain with several mass lesions with associated vasogenic edema  - s/p Decadron 4 mg IVP x 1   - Neurology evaluation  - Oncology evaluation ( Dr Anurag Bryan to see in AM) - from squamous cell lung CA ( recently diagnosed)  - s/p brain irradiation; has completed 9 out of 10 rounds  - CT brain with several mass lesions with associated vasogenic edema  - s/p Decadron 4 mg IVP x 1   - Neurology evaluation  - Oncology evaluation ( Dr Anurag Bryan to see in AM) - from squamous cell lung CA ( recently diagnosed)  - s/p brain irradiation; has completed 9 out of 10 rounds  - CT brain with several mass lesions with associated vasogenic edema  - s/p Decadron 4 mg IVP x 1   - Neurology evaluation  - Neuro checks q4  - Oncology evaluation ( Dr Anurag Bryan to see in AM)

## 2018-06-29 LAB
APPEARANCE UR: ABNORMAL
BILIRUB UR-MCNC: NEGATIVE — SIGNIFICANT CHANGE UP
COLOR SPEC: YELLOW — SIGNIFICANT CHANGE UP
DIFF PNL FLD: NEGATIVE — SIGNIFICANT CHANGE UP
GLUCOSE UR QL: NEGATIVE — SIGNIFICANT CHANGE UP
KETONES UR-MCNC: NEGATIVE — SIGNIFICANT CHANGE UP
LEUKOCYTE ESTERASE UR-ACNC: ABNORMAL
NITRITE UR-MCNC: NEGATIVE — SIGNIFICANT CHANGE UP
PH UR: 6.5 — SIGNIFICANT CHANGE UP (ref 5–8)
PROT UR-MCNC: SIGNIFICANT CHANGE UP
SP GR SPEC: 1.01 — SIGNIFICANT CHANGE UP (ref 1.01–1.02)
UROBILINOGEN FLD QL: NEGATIVE — SIGNIFICANT CHANGE UP

## 2018-06-29 RX ORDER — KETOROLAC TROMETHAMINE 30 MG/ML
15 SYRINGE (ML) INJECTION ONCE
Qty: 0 | Refills: 0 | Status: DISCONTINUED | OUTPATIENT
Start: 2018-06-29 | End: 2018-06-29

## 2018-06-29 RX ORDER — LEVETIRACETAM 250 MG/1
500 TABLET, FILM COATED ORAL
Qty: 0 | Refills: 0 | Status: DISCONTINUED | OUTPATIENT
Start: 2018-06-29 | End: 2018-07-06

## 2018-06-29 RX ADMIN — Medication 15 MILLIGRAM(S): at 20:57

## 2018-06-29 RX ADMIN — FAMOTIDINE 20 MILLIGRAM(S): 10 INJECTION INTRAVENOUS at 13:25

## 2018-06-29 RX ADMIN — OXYCODONE HYDROCHLORIDE 5 MILLIGRAM(S): 5 TABLET ORAL at 16:56

## 2018-06-29 RX ADMIN — Medication 4 MILLIGRAM(S): at 13:25

## 2018-06-29 RX ADMIN — Medication 4 MILLIGRAM(S): at 17:00

## 2018-06-29 RX ADMIN — Medication 1 TABLET(S): at 13:24

## 2018-06-29 RX ADMIN — OXYCODONE HYDROCHLORIDE 5 MILLIGRAM(S): 5 TABLET ORAL at 17:26

## 2018-06-29 RX ADMIN — BUDESONIDE AND FORMOTEROL FUMARATE DIHYDRATE 2 PUFF(S): 160; 4.5 AEROSOL RESPIRATORY (INHALATION) at 06:15

## 2018-06-29 RX ADMIN — Medication 4 MILLIGRAM(S): at 06:14

## 2018-06-29 RX ADMIN — LOSARTAN POTASSIUM 100 MILLIGRAM(S): 100 TABLET, FILM COATED ORAL at 06:14

## 2018-06-29 RX ADMIN — HEPARIN SODIUM 5000 UNIT(S): 5000 INJECTION INTRAVENOUS; SUBCUTANEOUS at 20:57

## 2018-06-29 RX ADMIN — Medication 4 MILLIGRAM(S): at 22:50

## 2018-06-29 RX ADMIN — LEVETIRACETAM 500 MILLIGRAM(S): 250 TABLET, FILM COATED ORAL at 18:10

## 2018-06-29 RX ADMIN — Medication 20 MILLIGRAM(S): at 13:24

## 2018-06-29 RX ADMIN — Medication 50 MICROGRAM(S): at 06:14

## 2018-06-29 RX ADMIN — Medication 15 MILLIGRAM(S): at 21:25

## 2018-06-29 RX ADMIN — BUDESONIDE AND FORMOTEROL FUMARATE DIHYDRATE 2 PUFF(S): 160; 4.5 AEROSOL RESPIRATORY (INHALATION) at 17:00

## 2018-06-29 RX ADMIN — HEPARIN SODIUM 5000 UNIT(S): 5000 INJECTION INTRAVENOUS; SUBCUTANEOUS at 06:14

## 2018-06-29 RX ADMIN — HEPARIN SODIUM 5000 UNIT(S): 5000 INJECTION INTRAVENOUS; SUBCUTANEOUS at 13:25

## 2018-06-29 RX ADMIN — AMLODIPINE BESYLATE 5 MILLIGRAM(S): 2.5 TABLET ORAL at 06:14

## 2018-06-29 NOTE — PROGRESS NOTE ADULT - ASSESSMENT
met  ca  lung  to  brain - copd-  hx  breast  and scc and ccc  followup  with neuro  and oncology- iv steroids

## 2018-06-29 NOTE — CONSULT NOTE ADULT - SUBJECTIVE AND OBJECTIVE BOX
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 She also c/o left wrist pain and swelling. Pt reports that she bent down to  something and suddenly felt dizzy and fell forward hitting her head against a cabinet. She reports trying to break her fall with her left hand. Pt denies LOC. Of note she c/o generalized weakness and fatigue  over the past few weeks and last had brain irradiation about 1 week ago ( 9 of 10 rounds). She denies fever/chills, nausea/vomiting, CP, SOB, palpitations. Pt also denies sick contacts."    Oncology : Dr Anurag Bryan  Radiation Oncology Dr. Angelo Jean      Allergies    penicillin (Hives)    Intolerances    PAST MEDICAL & SURGICAL HISTORY:  Localized enlarged lymph nodes  Depression  Anemia: on iron infusions  Glaucoma  Chronic obstructive pulmonary disease, unspecified COPD type: Pt was never hospitalized  for COPD  Acute deep vein thrombosis (DVT) of lower extremity, unspecified laterality, unspecified vein: left leg 10/2016 on Warfarin until 6/17  Essential hypertension  Hypothyroidism, unspecified type  Spinal stenosis, unspecified spinal region  BCC (basal cell carcinoma of skin): s/p MOHS  History of Mohs Home Medications:  amLODIPine 5 mg oral tablet: 1 tab(s) orally once a day (28 Jun 2018 18:21)  famotidine 20 mg oral tablet: 1 tab(s) orally once a day, As Needed (28 Jun 2018 18:21)  losartan 100 mg oral tablet: 1 tab(s) orally once a day noon (28 Jun 2018 18:21)  Multiple Vitamins with Minerals oral tablet: 1 tab(s) orally once a day (28 Jun 2018 18:21)  Paxil CR 25 mg oral tablet, extended release: 1 tab(s) orally once a day (noon) (28 Jun 2018 18:21)  Symbicort 160 mcg-4.5 mcg/inh inhalation aerosol: 2 puff(s) inhaled 2 times a day (28 Jun 2018 18:21)  Synthroid 50 mcg (0.05 mg) oral tablet: 1 tab(s) orally once a day am (28 Jun 2018 18:21)    surgery for squamous cell carcinoma of skin: 2/2017  Status post skin flap graft: nasal reconstruction, costal cartilage graft, flap revision   total of 4 reconstructive surgeries in 2017, last one in 8/2017  Bilateral cataracts: with lens insertion  S/P inguinal hernia repair  S/P lumpectomy, left breast: 1995 &amp; RADS  H/O total hysterectom    Vital Signs Last 24 Hrs  T(C): 36.7 (28 Jun 2018 19:40), Max: 36.7 (28 Jun 2018 15:27)  T(F): 98 (28 Jun 2018 19:40), Max: 98.1 (28 Jun 2018 15:27)  HR: 71 (28 Jun 2018 19:40) (69 - 82)  BP: 108/68 (28 Jun 2018 19:40) (108/68 - 157/70)  BP(mean): 87 (28 Jun 2018 17:48) (87 - 87)  RR: 18 (28 Jun 2018 19:40) (17 - 18)  SpO2: 96% (28 Jun 2018 19:40) (95% - 100%)    Neuro Exam:  MS - AAOx3, naming and repetition in tact  CNs - EOMI, PERRL, face grossly symmetric  Motor - 5/5 throughout  Sensroy - light touch in tact throughout  Coordination - FNFN in tact b/l  Ambulating at baseline    Labs                        10.0   7.8   )-----------( 412      ( 28 Jun 2018 16:55 )             31.2   06-28    x   |  x   |  x   ----------------------------<  x   4.3   |  x   |  x     Ca    9.2      28 Jun 2018 16:55    TPro  6.8  /  Alb  3.1<L>  /  TBili  0.3  /  DBili  x   /  AST  45<H>  /  ALT  14  /  AlkPhos  90  06-28  LIVER FUNCTIONS - ( 28 Jun 2018 16:55 )  Alb: 3.1 g/dL / Pro: 6.8 g/dL / ALK PHOS: 90 U/L / ALT: 14 U/L / AST: 45 U/L / GGT: x           PT/INR - ( 28 Jun 2018 16:55 )   PT: 11.6 sec;   INR: 1.06 ratio         PTT - ( 28 Jun 2018 16:55 )  PTT:26.6 sec    < from: CT Head No Cont (06.28.18 @ 17:04) >  Several mass lesions with associated vasogenic edema are suspected one in   the left basal ganglia region one in the high right vertex region andone   in the region of the left temporal lobe. Mass effect on the frontal horn   left lateral ventricle as well as the third ventricle is appreciated.   Recommend contrast study preferably MR for more complete evaluation.     < end of copied text >  < from: CT Head No Cont (06.28.18 @ 17:04) >  Several mass lesions with associated vasogenic edema are suspected one in   the left basal ganglia region one in the high right vertex region andone   in the region of the left temporal lobe. Mass effect on the frontal horn   left lateral ventricle as well as the third ventricle is appreciated.   Recommend contrast study preferably MR for more complete evaluation.     < end of copied text >
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 She also c/o left wrist pain and swelling. Pt reports that she bent down to  something and suddenly felt dizzy and fell forward hitting her head against a cabinet. She reports trying to break her fall with her left hand. Pt denies LOC. Of note she c/o generalized weakness and fatigue  over the past few weeks and last had brain irradiation about 1 week ago ( 9 of 10 rounds). She denies fever/chills, nausea/vomiting, CP, SOB, palpitations. Pt also denies sick contacts.     ED COURSE  VS : 115/71  82  17  O2 95% on room air T 97.9F  Labs: wbc 7.8  h/h 10/31 plt 412  Na 133 K 5.8 ( hemolysed sample) bun/Cr 11/0.64 AST 45  Albumin 3.1  Treatment : Tylenol 1g IVPB x 1, Morphine 2mg IVP x 1, Decadron 4mg IVP x 1 (28 Jun 2018 17:48)    PAST MEDICAL HISTORY   Localized enlarged lymph nodes  Depression  GERD (gastroesophageal reflux disease)  Anemia  Glaucoma  Chronic obstructive pulmonary disease, unspecified COPD type  Acute deep vein thrombosis (DVT) of lower extremity, unspecified laterality, unspecified vein  Essential hypertension  Hypothyroidism, unspecified type  Spinal stenosis, unspecified spinal region  BCC (basal cell carcinoma of skin)  SCC (squamous cell carcinoma)    PAST SURGICAL HISTORY   History of Mohs surgery for squamous cell carcinoma of skin  Status post skin flap graft  Facial basal cell cancer  Bilateral cataracts  S/P inguinal hernia repair  S/P lumpectomy, left breast  Status post nasal surgery  H/O total hysterectomy    penicillin (Hives)      MEDICATIONS:  Antibiotics:    Neuro:  oxyCODONE    IR 5 milliGRAM(s) Oral every 6 hours PRN  PARoxetine 20 milliGRAM(s) Oral daily    Anticoagulation:  heparin  Injectable 5000 Unit(s) SubCutaneous every 8 hours    Other:  amLODIPine   Tablet 5 milliGRAM(s) Oral daily  buDESOnide 160 MICROgram(s)/formoterol 4.5 MICROgram(s) Inhaler 2 Puff(s) Inhalation two times a day  dexamethasone  Injectable 4 milliGRAM(s) IV Push every 6 hours  famotidine    Tablet 20 milliGRAM(s) Oral daily  levothyroxine 50 MICROGram(s) Oral daily  losartan 100 milliGRAM(s) Oral daily  multivitamin/minerals 1 Tablet(s) Oral daily      SOCIAL HISTORY:   Occupation:   Marital Status:     FAMILY HISTORY:  Family history of cancer (Father)      REVIEW OF SYSTEMS:  Check here if all are normal other than Neurological []  General:  Eyes:  ENT:  Cardiac:  Respiratory:  GI:  Musculoskeletal:   Skin:  Neurologic:   Psychiatric:     PHYSICAL EXAMINATION:   T(C): 36.7 (06-28-18 @ 19:40), Max: 36.7 (06-28-18 @ 15:27)  HR: 71 (06-28-18 @ 19:40) (69 - 82)  BP: 108/68 (06-28-18 @ 19:40) (108/68 - 157/70)  RR: 18 (06-28-18 @ 19:40) (17 - 18)  SpO2: 96% (06-28-18 @ 19:40) (95% - 100%)  Wt(kg): --Height (cm): 152.4 (06-28 @ 19:40)  Weight (kg): 45.3 (06-28 @ 19:40)    General Examination:     Neurologic Examination:           AOx3, FC, PERRL, EOMI, V1-3 intact, no facial, palate yessica symmetric, tongue midline, shrug 5/5  5/5 throughout, no drift  SILT  No clonus or babinski      LABS:                        10.0   7.8   )-----------( 412      ( 28 Jun 2018 16:55 )             31.2     06-28    x   |  x   |  x   ----------------------------<  x   4.3   |  x   |  x     Ca    9.2      28 Jun 2018 16:55    TPro  6.8  /  Alb  3.1<L>  /  TBili  0.3  /  DBili  x   /  AST  45<H>  /  ALT  14  /  AlkPhos  90  06-28    PT/INR - ( 28 Jun 2018 16:55 )   PT: 11.6 sec;   INR: 1.06 ratio         PTT - ( 28 Jun 2018 16:55 )  PTT:26.6 sec      RADIOLOGY & ADDITIONAL STUDIES:  IMPRESSION:   Several mass lesions with associated vasogenic edema are suspected one in   the left basal ganglia region one in the high right vertex region andone   in the region of the left temporal lobe. Mass effect on the frontal horn   left lateral ventricle as well as the third ventricle is appreciated.   Recommend contrast study preferably MR for more complete evaluation.     Dr. Ferrer discussed the findings with Dr. Javier at 5:50 PM on 6/28/2018   with read back.
Patient is a 77y old  Female who presents with a chief complaint of weakness (28 Jun 2018 22:51)       Pt is seen and examined  pt is awake and lying in bed/out of bed to chair  pt seems comfortable and denies any complaints at this time          ROS:  Negative except for:    MEDICATIONS  (STANDING):  amLODIPine   Tablet 5 milliGRAM(s) Oral daily  buDESOnide 160 MICROgram(s)/formoterol 4.5 MICROgram(s) Inhaler 2 Puff(s) Inhalation two times a day  dexamethasone  Injectable 4 milliGRAM(s) IV Push every 6 hours  famotidine    Tablet 20 milliGRAM(s) Oral daily  heparin  Injectable 5000 Unit(s) SubCutaneous every 8 hours  levETIRAcetam 500 milliGRAM(s) Oral two times a day  levothyroxine 50 MICROGram(s) Oral daily  losartan 100 milliGRAM(s) Oral daily  multivitamin/minerals 1 Tablet(s) Oral daily  PARoxetine 20 milliGRAM(s) Oral daily    MEDICATIONS  (PRN):  oxyCODONE    IR 5 milliGRAM(s) Oral every 6 hours PRN Severe Pain (7 - 10)      Allergies    penicillin (Hives)    Intolerances        Vital Signs Last 24 Hrs  T(C): 36.6 (29 Jun 2018 21:15), Max: 36.8 (29 Jun 2018 07:29)  T(F): 97.9 (29 Jun 2018 21:15), Max: 98.3 (29 Jun 2018 07:29)  HR: 76 (29 Jun 2018 21:15) (70 - 82)  BP: 92/56 (29 Jun 2018 21:15) (92/56 - 153/75)  BP(mean): --  RR: 18 (29 Jun 2018 21:15) (18 - 18)  SpO2: 96% (29 Jun 2018 21:15) (95% - 97%)        LABS:                          10.0   7.8   )-----------( 412      ( 28 Jun 2018 16:55 )             31.2         Mean Cell Volume : 81.8 fl  Mean Cell Hemoglobin : 26.2 pg  Mean Cell Hemoglobin Concentration : 32.1 gm/dL  Auto Neutrophil # : 6.4 K/uL  Auto Lymphocyte # : 0.8 K/uL  Auto Monocyte # : 0.5 K/uL  Auto Eosinophil # : 0.1 K/uL  Auto Basophil # : 0.0 K/uL  Auto Neutrophil % : 82.0 %  Auto Lymphocyte % : 10.8 %  Auto Monocyte % : 6.0 %  Auto Eosinophil % : 0.9 %  Auto Basophil % : 0.3 %    Serial CBC's  06-28 @ 16:55  Hct-31.2 / Hgb-10.0 / Plat-412 / RBC-3.81 / WBC-7.8            06-28    x   |  x   |  x   ----------------------------<  x   4.3   |  x   |  x     Ca    9.2      28 Jun 2018 16:55    TPro  6.8  /  Alb  3.1<L>  /  TBili  0.3  /  DBili  x   /  AST  45<H>  /  ALT  14  /  AlkPhos  90  06-28      PT/INR - ( 28 Jun 2018 16:55 )   PT: 11.6 sec;   INR: 1.06 ratio         PTT - ( 28 Jun 2018 16:55 )  PTT:26.6 sec    WBC Count: 7.8 K/uL (06-28-18 @ 16:55)  Hemoglobin: 10.0 g/dL (06-28-18 @ 16:55)  Hematocrit: 31.2 % (06-28-18 @ 16:55)  Platelet Count - Automated: 412 K/uL (06-28-18 @ 16:55)                      RADIOLOGY & ADDITIONAL STUDIES:< from: Xray Chest 1 View- PORTABLE-Urgent (06.28.18 @ 17:29) >  EXAM:  XR CHEST PORTABLE URGENT 1V                            PROCEDURE DATE:  06/28/2018            INTERPRETATION:  CLINICAL INDICATION: Status post fall; evaluate for   pneumothorax    TECHNIQUE: Frontal radiograph of the chest    COMPARISON:  Radiograph of the chest from 6/12/2018      FINDINGS:    Biapical pleural thickening is noted. Stable streaky opacities in the   right upper lobe are noted. Left lower lung surgical clips are noted. The   lungs are clear. There is no evidence of pneumothorax or pleural   effusion. The cardiomediastinal silhouette is stable in size. No   displaced rib fractures are noted.    IMPRESSION:  No pneumothorax.                GINA RAVI M.D., RADIOLOGY RESIDENT  This document has been electronically signed.  SABINO DARDEN M.D., ATTENDING RADIOLOGIST  This document has been electronically signed. Jun 29 2018  9:37AM                < end of copied text >

## 2018-06-29 NOTE — CONSULT NOTE ADULT - ASSESSMENT
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  Dr Shakir Ferrer did FNA on the mediastinal nodes on 6-12-18. The pathology showed squamous cell carcinoma which is 5-10 % + PD-L!. Pt is qualified to receive Keytruda, an immunotherapy drug.    Pt is refusing any chemotherapy or immunotherapy treatment.
77F h/o breast, SCC, basal cell, lung ca with known mets to the brain being managed via WBRT 9/10 doses here with CTH showing multiple mets with surrounding edema.   - No acute neurosurgical intervetnion  - Dec 4q6  - Keppra 500 BID  - Cont care as per onc / rad onc. Taper steroids per rad onc. No further neurosurgical intervention anticipated at this time.
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 She also c/o left wrist pain and swelling. Pt reports that she bent down to  something and suddenly felt dizzy and fell forward hitting her head against a cabinet. Neurology consulted for brain mets seen on CT head. Exam is non-focal. Ct head showed Several mass lesions with associated vasogenic edema are suspected one in the left basal ganglia region one in the high right vertex region and one in the region of the left temporal lobe. Mass effect on the frontal horn left lateral ventricle as well as the third ventricle is appreciated.     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 George as an outpatient for initiation of Neuro-oncology care.

## 2018-06-29 NOTE — PROGRESS NOTE ADULT - SUBJECTIVE AND OBJECTIVE BOX
Patient is a 77y old  Female who presents with a chief complaint of weakness (28 Jun 2018 22:51)      INTERVAL HPI/OVERNIGHT EVENTS:    MEDICATIONS  (STANDING):  amLODIPine   Tablet 5 milliGRAM(s) Oral daily  buDESOnide 160 MICROgram(s)/formoterol 4.5 MICROgram(s) Inhaler 2 Puff(s) Inhalation two times a day  dexamethasone  Injectable 4 milliGRAM(s) IV Push every 6 hours  famotidine    Tablet 20 milliGRAM(s) Oral daily  heparin  Injectable 5000 Unit(s) SubCutaneous every 8 hours  levothyroxine 50 MICROGram(s) Oral daily  losartan 100 milliGRAM(s) Oral daily  multivitamin/minerals 1 Tablet(s) Oral daily  PARoxetine 20 milliGRAM(s) Oral daily    MEDICATIONS  (PRN):  oxyCODONE    IR 5 milliGRAM(s) Oral every 6 hours PRN Severe Pain (7 - 10)      Allergies    penicillin (Hives)    Intolerances        Vital Signs Last 24 Hrs  T(C): 36.8 (29 Jun 2018 07:29), Max: 36.8 (29 Jun 2018 07:29)  T(F): 98.3 (29 Jun 2018 07:29), Max: 98.3 (29 Jun 2018 07:29)  HR: 70 (29 Jun 2018 07:29) (69 - 82)  BP: 138/73 (29 Jun 2018 07:29) (108/68 - 157/70)  BP(mean): 87 (28 Jun 2018 17:48) (87 - 87)  RR: 18 (29 Jun 2018 07:29) (17 - 18)  SpO2: 97% (29 Jun 2018 07:29) (95% - 100%)    LABS:                        10.0   7.8   )-----------( 412      ( 28 Jun 2018 16:55 )             31.2     06-28    x   |  x   |  x   ----------------------------<  x   4.3   |  x   |  x     Ca    9.2      28 Jun 2018 16:55    TPro  6.8  /  Alb  3.1<L>  /  TBili  0.3  /  DBili  x   /  AST  45<H>  /  ALT  14  /  AlkPhos  90  06-28    PT/INR - ( 28 Jun 2018 16:55 )   PT: 11.6 sec;   INR: 1.06 ratio         PTT - ( 28 Jun 2018 16:55 )  PTT:26.6 sec      RADIOLOGY & ADDITIONAL TESTS:        Dr Narayanan [phone]

## 2018-06-30 LAB
ANION GAP SERPL CALC-SCNC: 13 MMOL/L — SIGNIFICANT CHANGE UP (ref 5–17)
BUN SERPL-MCNC: 23 MG/DL — SIGNIFICANT CHANGE UP (ref 7–23)
CALCIUM SERPL-MCNC: 9.1 MG/DL — SIGNIFICANT CHANGE UP (ref 8.4–10.5)
CHLORIDE SERPL-SCNC: 97 MMOL/L — SIGNIFICANT CHANGE UP (ref 96–108)
CO2 SERPL-SCNC: 24 MMOL/L — SIGNIFICANT CHANGE UP (ref 22–31)
CREAT SERPL-MCNC: 0.72 MG/DL — SIGNIFICANT CHANGE UP (ref 0.5–1.3)
GLUCOSE SERPL-MCNC: 153 MG/DL — HIGH (ref 70–99)
HCT VFR BLD CALC: 28.5 % — LOW (ref 34.5–45)
HGB BLD-MCNC: 8.6 G/DL — LOW (ref 11.5–15.5)
MCHC RBC-ENTMCNC: 24 PG — LOW (ref 27–34)
MCHC RBC-ENTMCNC: 30.2 GM/DL — LOW (ref 32–36)
MCV RBC AUTO: 79.6 FL — LOW (ref 80–100)
PLATELET # BLD AUTO: 532 K/UL — HIGH (ref 150–400)
POTASSIUM SERPL-MCNC: 4.3 MMOL/L — SIGNIFICANT CHANGE UP (ref 3.5–5.3)
POTASSIUM SERPL-SCNC: 4.3 MMOL/L — SIGNIFICANT CHANGE UP (ref 3.5–5.3)
RBC # BLD: 3.58 M/UL — LOW (ref 3.8–5.2)
RBC # FLD: 17.9 % — HIGH (ref 10.3–14.5)
SODIUM SERPL-SCNC: 134 MMOL/L — LOW (ref 135–145)
WBC # BLD: 8.97 K/UL — SIGNIFICANT CHANGE UP (ref 3.8–10.5)
WBC # FLD AUTO: 8.97 K/UL — SIGNIFICANT CHANGE UP (ref 3.8–10.5)

## 2018-06-30 RX ORDER — ACETAMINOPHEN 500 MG
650 TABLET ORAL ONCE
Qty: 0 | Refills: 0 | Status: COMPLETED | OUTPATIENT
Start: 2018-06-30 | End: 2018-06-30

## 2018-06-30 RX ORDER — ACETAMINOPHEN 500 MG
1000 TABLET ORAL ONCE
Qty: 0 | Refills: 0 | Status: DISCONTINUED | OUTPATIENT
Start: 2018-06-30 | End: 2018-06-30

## 2018-06-30 RX ORDER — LIDOCAINE 4 G/100G
1 CREAM TOPICAL ONCE
Qty: 0 | Refills: 0 | Status: COMPLETED | OUTPATIENT
Start: 2018-06-30 | End: 2018-06-30

## 2018-06-30 RX ADMIN — AMLODIPINE BESYLATE 5 MILLIGRAM(S): 2.5 TABLET ORAL at 05:07

## 2018-06-30 RX ADMIN — Medication 4 MILLIGRAM(S): at 05:07

## 2018-06-30 RX ADMIN — Medication 1 TABLET(S): at 11:43

## 2018-06-30 RX ADMIN — Medication 260 MILLIGRAM(S): at 22:48

## 2018-06-30 RX ADMIN — Medication 4 MILLIGRAM(S): at 11:43

## 2018-06-30 RX ADMIN — Medication 4 MILLIGRAM(S): at 23:32

## 2018-06-30 RX ADMIN — LIDOCAINE 1 PATCH: 4 CREAM TOPICAL at 16:06

## 2018-06-30 RX ADMIN — HEPARIN SODIUM 5000 UNIT(S): 5000 INJECTION INTRAVENOUS; SUBCUTANEOUS at 21:46

## 2018-06-30 RX ADMIN — HEPARIN SODIUM 5000 UNIT(S): 5000 INJECTION INTRAVENOUS; SUBCUTANEOUS at 14:30

## 2018-06-30 RX ADMIN — FAMOTIDINE 20 MILLIGRAM(S): 10 INJECTION INTRAVENOUS at 11:43

## 2018-06-30 RX ADMIN — Medication 20 MILLIGRAM(S): at 11:43

## 2018-06-30 RX ADMIN — HEPARIN SODIUM 5000 UNIT(S): 5000 INJECTION INTRAVENOUS; SUBCUTANEOUS at 05:07

## 2018-06-30 RX ADMIN — Medication 50 MICROGRAM(S): at 05:07

## 2018-06-30 RX ADMIN — LEVETIRACETAM 500 MILLIGRAM(S): 250 TABLET, FILM COATED ORAL at 17:51

## 2018-06-30 RX ADMIN — Medication 4 MILLIGRAM(S): at 17:51

## 2018-06-30 RX ADMIN — BUDESONIDE AND FORMOTEROL FUMARATE DIHYDRATE 2 PUFF(S): 160; 4.5 AEROSOL RESPIRATORY (INHALATION) at 16:13

## 2018-06-30 RX ADMIN — Medication 650 MILLIGRAM(S): at 15:00

## 2018-06-30 RX ADMIN — BUDESONIDE AND FORMOTEROL FUMARATE DIHYDRATE 2 PUFF(S): 160; 4.5 AEROSOL RESPIRATORY (INHALATION) at 05:08

## 2018-06-30 RX ADMIN — LOSARTAN POTASSIUM 100 MILLIGRAM(S): 100 TABLET, FILM COATED ORAL at 05:07

## 2018-06-30 RX ADMIN — LEVETIRACETAM 500 MILLIGRAM(S): 250 TABLET, FILM COATED ORAL at 05:07

## 2018-06-30 RX ADMIN — Medication 260 MILLIGRAM(S): at 14:25

## 2018-06-30 RX ADMIN — OXYCODONE HYDROCHLORIDE 5 MILLIGRAM(S): 5 TABLET ORAL at 11:49

## 2018-06-30 RX ADMIN — OXYCODONE HYDROCHLORIDE 5 MILLIGRAM(S): 5 TABLET ORAL at 12:19

## 2018-06-30 NOTE — PHYSICAL THERAPY INITIAL EVALUATION ADULT - PERTINENT HX OF CURRENT PROBLEM, REHAB EVAL
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 She also c/o left wrist pain and swelling. Pt reports that she bent down to  something and suddenly felt dizzy and fell forward hitting her head against a cabinet. She reports trying to break her fall with her left hand. Pt denies LOC. Results shows decreased h/h,

## 2018-06-30 NOTE — PROGRESS NOTE ADULT - ASSESSMENT
Squamous  cell ca of lung  with mets to brain/vasogenic  edema - hx  copd-  scc-breast  ca  case  dw  Hematology Dr Marin  and son at length  yesterday-  pt  refusing  chemo- cont  tx

## 2018-06-30 NOTE — PHYSICAL THERAPY INITIAL EVALUATION ADULT - BALANCE TRAINING, PT EVAL
Pt will demonstrate good standing balance to improve functional mobility w/o LOB to reduce fall risks in 2weeks

## 2018-06-30 NOTE — PHYSICAL THERAPY INITIAL EVALUATION ADULT - STRENGTHENING, PT EVAL
pt will demonstrate good strength in BLE/BUEs to improve limb stability during dynamic functional activities in 2weeks

## 2018-06-30 NOTE — PHYSICAL THERAPY INITIAL EVALUATION ADULT - DISCHARGE DISPOSITION, PT EVAL
home w/ home PT/Home w/assist of family as needed and home PT to improve her strength, balance, coordination, ambulation and for home safety assessment

## 2018-06-30 NOTE — CHART NOTE - NSCHARTNOTEFT_GEN_A_CORE
MEDICINE [facility]  Patient is a 77 year old female who presents with a chief complaint of weakness. (28 Jun 2018 22:51)       Event Summary:  Called by RN at 22:22 to report patient was hypotensive at 21:01.  Manual blood pressure done at 22:18 noted to be 106/58.  Patient seen and examined at the bedside.  She is alert and does not endorse any complaints at this time.  Denies dizziness, lightheadedness, headache, chest pain, palpitations, shortness of breath, abdominal pain, nausea, vomiting and diarrhea.      Vital Signs Last 24 Hrs  T(C): 36.6 (30 Jun 2018 21:01), Max: 36.6 (30 Jun 2018 15:35)  T(F): 97.9 (30 Jun 2018 21:01), Max: 97.9 (30 Jun 2018 15:35)  HR: 62 (30 Jun 2018 22:18) (62 - 80)  BP: 106/58 (30 Jun 2018 22:18) (76/49 - 126/57)  RR: 18 (30 Jun 2018 22:18) (18 - 18)  SpO2: 98% (30 Jun 2018 22:18) (97% - 98%)      PHYSICAL EXAM:  GENERAL: Laying down, in no acute distress  PSYCH: AAOx3  HEART: +S1/S2.  Regular rate and rhythm.  No murmurs, rubs or gallops  CHEST/LUNG: Breath sounds clear to auscultation bilaterally.  No wheezes, rales, rhonchi or crackles  ABDOMEN: Bowel sounds present in all 4 quadrants.  Soft, Nontender, Nondistended      HPI:  Patient is a 77 year old female with a PMHx of squamous cell carcinoma, basal cell carcinoma, breast cancer, hypothyroidism, COPD and recent diagnosis of lung cancer (with metastasis to brain S/P brain radiation) who presented with S/P fall with complaint of generalized weakness and sacral pain. (28 Jun 2018 17:48)  Now with hypotension.      PLAN: Hypotension  - Now resolved with no intervention  - Monitor vital signs q4 hours  - Will continue to monitor closely  - Primary team to follow up in AM      #[id number]  Sakian Smith PA-C  Medicine Department

## 2018-06-30 NOTE — PROGRESS NOTE ADULT - SUBJECTIVE AND OBJECTIVE BOX
Patient is a 77y old  Female who presents with a chief complaint of weakness (28 Jun 2018 22:51)       Pt is seen and examined  pt is awake and sitting in bed  pt seems comfortable       MEDICATIONS  (STANDING):  amLODIPine   Tablet 5 milliGRAM(s) Oral daily  buDESOnide 160 MICROgram(s)/formoterol 4.5 MICROgram(s) Inhaler 2 Puff(s) Inhalation two times a day  dexamethasone  Injectable 4 milliGRAM(s) IV Push every 6 hours  famotidine    Tablet 20 milliGRAM(s) Oral daily  heparin  Injectable 5000 Unit(s) SubCutaneous every 8 hours  levETIRAcetam 500 milliGRAM(s) Oral two times a day  levothyroxine 50 MICROGram(s) Oral daily  losartan 100 milliGRAM(s) Oral daily  multivitamin/minerals 1 Tablet(s) Oral daily  PARoxetine 20 milliGRAM(s) Oral daily    MEDICATIONS  (PRN):  oxyCODONE    IR 5 milliGRAM(s) Oral every 6 hours PRN Severe Pain (7 - 10)      Allergies    penicillin (Hives)    Intolerances        Vital Signs Last 24 Hrs  T(C): 36.6 (30 Jun 2018 15:35), Max: 36.6 (29 Jun 2018 21:15)  T(F): 97.9 (30 Jun 2018 15:35), Max: 97.9 (29 Jun 2018 21:15)  HR: 72 (30 Jun 2018 18:55) (68 - 80)  BP: 112/65 (30 Jun 2018 18:55) (88/47 - 126/57)  BP(mean): --  RR: 18 (30 Jun 2018 18:55) (18 - 18)  SpO2: 97% (30 Jun 2018 18:55) (96% - 97%)        LABS:                          8.6    8.97  )-----------( 532      ( 30 Jun 2018 08:36 )             28.5         Mean Cell Volume : 79.6 fl  Mean Cell Hemoglobin : 24.0 pg  Mean Cell Hemoglobin Concentration : 30.2 gm/dL  Auto Neutrophil # : x  Auto Lymphocyte # : x  Auto Monocyte # : x  Auto Eosinophil # : x  Auto Basophil # : x  Auto Neutrophil % : x  Auto Lymphocyte % : x  Auto Monocyte % : x  Auto Eosinophil % : x  Auto Basophil % : x    Serial CBC's  06-30 @ 08:36  Hct-28.5 / Hgb-8.6 / Plat-532 / RBC-3.58 / WBC-8.97          Serial CBC's  06-28 @ 16:55  Hct-31.2 / Hgb-10.0 / Plat-412 / RBC-3.81 / WBC-7.8            06-30    134<L>  |  97  |  23  ----------------------------<  153<H>  4.3   |  24  |  0.72    Ca    9.1      30 Jun 2018 06:36            WBC Count: 8.97 K/uL (06-30-18 @ 08:36)  Hemoglobin: 8.6 g/dL (06-30-18 @ 08:36)  Hematocrit: 28.5 % (06-30-18 @ 08:36)  Platelet Count - Automated: 532 K/uL (06-30-18 @ 08:36)  WBC Count: 7.8 K/uL (06-28-18 @ 16:55)  Hemoglobin: 10.0 g/dL (06-28-18 @ 16:55)  Hematocrit: 31.2 % (06-28-18 @ 16:55)  Platelet Count - Automated: 412 K/uL (06-28-18 @ 16:55)

## 2018-06-30 NOTE — PHYSICAL THERAPY INITIAL EVALUATION ADULT - ADDITIONAL COMMENTS
cont: CT lumbar shows L2 retrolisthesis on L3, Xray Hand/wrist -fx/ dislocation,    Social hx: lives with spouse in an appt on 1st floor, +ramp, PTA pt was independent in ADLs and functional ambulation w/o AD.

## 2018-06-30 NOTE — PROGRESS NOTE ADULT - SUBJECTIVE AND OBJECTIVE BOX
Patient is a 77y old  Female who presents with a chief complaint of weakness (2018 22:51)      INTERVAL HPI/OVERNIGHT EVENTS:    MEDICATIONS  (STANDING):  amLODIPine   Tablet 5 milliGRAM(s) Oral daily  buDESOnide 160 MICROgram(s)/formoterol 4.5 MICROgram(s) Inhaler 2 Puff(s) Inhalation two times a day  dexamethasone  Injectable 4 milliGRAM(s) IV Push every 6 hours  famotidine    Tablet 20 milliGRAM(s) Oral daily  heparin  Injectable 5000 Unit(s) SubCutaneous every 8 hours  levETIRAcetam 500 milliGRAM(s) Oral two times a day  levothyroxine 50 MICROGram(s) Oral daily  losartan 100 milliGRAM(s) Oral daily  multivitamin/minerals 1 Tablet(s) Oral daily  PARoxetine 20 milliGRAM(s) Oral daily    MEDICATIONS  (PRN):  oxyCODONE    IR 5 milliGRAM(s) Oral every 6 hours PRN Severe Pain (7 - 10)      Allergies    penicillin (Hives)    Intolerances        Vital Signs Last 24 Hrs  T(C): 36.6 (2018 21:15), Max: 36.6 (2018 15:09)  T(F): 97.9 (2018 21:15), Max: 97.9 (2018 21:15)  HR: 80 (2018 05:06) (76 - 82)  BP: 126/57 (2018 05:06) (92/56 - 153/75)  BP(mean): --  RR: 18 (2018 21:15) (18 - 18)  SpO2: 96% (2018 21:15) (96% - 97%)    LABS:                        10.0   7.8   )-----------( 412      ( 2018 16:55 )             31.2     06-30    134<L>  |  97  |  23  ----------------------------<  153<H>  4.3   |  24  |  0.72    Ca    9.1      2018 06:36    TPro  6.8  /  Alb  3.1<L>  /  TBili  0.3  /  DBili  x   /  AST  45<H>  /  ALT  14  /  AlkPhos  90  -28    PT/INR - ( 2018 16:55 )   PT: 11.6 sec;   INR: 1.06 ratio         PTT - ( 2018 16:55 )  PTT:26.6 sec  Urinalysis Basic - ( 2018 14:56 )    Color: Yellow / Appearance: SL Turbid / S.013 / pH: x  Gluc: x / Ketone: Negative  / Bili: Negative / Urobili: Negative   Blood: x / Protein: Trace / Nitrite: Negative   Leuk Esterase: Large / RBC: 0-2 /HPF / WBC 25-50 /HPF   Sq Epi: x / Non Sq Epi: Few /HPF / Bacteria: Few /HPF        RADIOLOGY & ADDITIONAL TESTS:        Dr Narayanan [phone]

## 2018-06-30 NOTE — PROGRESS NOTE ADULT - ASSESSMENT
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  Dr Shakir Ferrer did FNA on the mediastinal nodes on 6-12-18. The pathology showed squamous cell carcinoma which is 5-10 % + PD-L1. Pt is qualified to receive Keytruda, an immunotherapy drug.    Pt agreed to chemotherapy and immunotherapy. Dr Shakir Frerer will put a Mediport in pt.    Chemotherapy plans discussed with pt, pt's  and 2 sons, Shakir and Ananda. 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  Dr Shakir Ferrer did FNA on the mediastinal nodes on 6-12-18. The pathology showed squamous cell carcinoma which is 5-10 % + PD-L1. Pt is qualified to receive Keytruda, an immunotherapy drug.    Pt agreed to chemotherapy and immunotherapy. Dr Shakir Ferrer will put a Mediport in pt.    Chemotherapy plans discussed with pt, pt's  and 2 sons, Shakir and Ananda.    Anemia: will check ferritin, B12 and folate levels.

## 2018-07-01 LAB
ANION GAP SERPL CALC-SCNC: 17 MMOL/L — SIGNIFICANT CHANGE UP (ref 5–17)
BUN SERPL-MCNC: 20 MG/DL — SIGNIFICANT CHANGE UP (ref 7–23)
CALCIUM SERPL-MCNC: 9 MG/DL — SIGNIFICANT CHANGE UP (ref 8.4–10.5)
CHLORIDE SERPL-SCNC: 100 MMOL/L — SIGNIFICANT CHANGE UP (ref 96–108)
CO2 SERPL-SCNC: 22 MMOL/L — SIGNIFICANT CHANGE UP (ref 22–31)
CREAT SERPL-MCNC: 0.66 MG/DL — SIGNIFICANT CHANGE UP (ref 0.5–1.3)
FERRITIN SERPL-MCNC: 127 NG/ML — SIGNIFICANT CHANGE UP (ref 15–150)
FOLATE SERPL-MCNC: 15.9 NG/ML — SIGNIFICANT CHANGE UP
GLUCOSE SERPL-MCNC: 148 MG/DL — HIGH (ref 70–99)
HCT VFR BLD CALC: 27.5 % — LOW (ref 34.5–45)
HGB BLD-MCNC: 8.8 G/DL — LOW (ref 11.5–15.5)
MCHC RBC-ENTMCNC: 25.4 PG — LOW (ref 27–34)
MCHC RBC-ENTMCNC: 32 GM/DL — SIGNIFICANT CHANGE UP (ref 32–36)
MCV RBC AUTO: 79.5 FL — LOW (ref 80–100)
PLATELET # BLD AUTO: 511 K/UL — HIGH (ref 150–400)
POTASSIUM SERPL-MCNC: 4.1 MMOL/L — SIGNIFICANT CHANGE UP (ref 3.5–5.3)
POTASSIUM SERPL-SCNC: 4.1 MMOL/L — SIGNIFICANT CHANGE UP (ref 3.5–5.3)
RBC # BLD: 3.46 M/UL — LOW (ref 3.8–5.2)
RBC # FLD: 17.9 % — HIGH (ref 10.3–14.5)
SODIUM SERPL-SCNC: 139 MMOL/L — SIGNIFICANT CHANGE UP (ref 135–145)
VIT B12 SERPL-MCNC: 1198 PG/ML — SIGNIFICANT CHANGE UP (ref 232–1245)
WBC # BLD: 7.99 K/UL — SIGNIFICANT CHANGE UP (ref 3.8–10.5)
WBC # FLD AUTO: 7.99 K/UL — SIGNIFICANT CHANGE UP (ref 3.8–10.5)

## 2018-07-01 RX ORDER — DIPHENHYDRAMINE HCL 50 MG
12.5 CAPSULE ORAL ONCE
Qty: 0 | Refills: 0 | Status: DISCONTINUED | OUTPATIENT
Start: 2018-07-01 | End: 2018-07-01

## 2018-07-01 RX ORDER — DIPHENHYDRAMINE HCL 50 MG
12.5 CAPSULE ORAL ONCE
Qty: 0 | Refills: 0 | Status: COMPLETED | OUTPATIENT
Start: 2018-07-01 | End: 2018-07-01

## 2018-07-01 RX ADMIN — Medication 4 MILLIGRAM(S): at 23:12

## 2018-07-01 RX ADMIN — Medication 4 MILLIGRAM(S): at 05:05

## 2018-07-01 RX ADMIN — Medication 12.5 MILLIGRAM(S): at 04:54

## 2018-07-01 RX ADMIN — Medication 50 MICROGRAM(S): at 05:05

## 2018-07-01 RX ADMIN — Medication 20 MILLIGRAM(S): at 11:52

## 2018-07-01 RX ADMIN — Medication 1 TABLET(S): at 12:18

## 2018-07-01 RX ADMIN — LOSARTAN POTASSIUM 100 MILLIGRAM(S): 100 TABLET, FILM COATED ORAL at 05:03

## 2018-07-01 RX ADMIN — HEPARIN SODIUM 5000 UNIT(S): 5000 INJECTION INTRAVENOUS; SUBCUTANEOUS at 21:32

## 2018-07-01 RX ADMIN — Medication 4 MILLIGRAM(S): at 11:41

## 2018-07-01 RX ADMIN — FAMOTIDINE 20 MILLIGRAM(S): 10 INJECTION INTRAVENOUS at 13:17

## 2018-07-01 RX ADMIN — Medication 12.5 MILLIGRAM(S): at 22:32

## 2018-07-01 RX ADMIN — BUDESONIDE AND FORMOTEROL FUMARATE DIHYDRATE 2 PUFF(S): 160; 4.5 AEROSOL RESPIRATORY (INHALATION) at 05:03

## 2018-07-01 RX ADMIN — AMLODIPINE BESYLATE 5 MILLIGRAM(S): 2.5 TABLET ORAL at 05:03

## 2018-07-01 RX ADMIN — LEVETIRACETAM 500 MILLIGRAM(S): 250 TABLET, FILM COATED ORAL at 17:15

## 2018-07-01 RX ADMIN — OXYCODONE HYDROCHLORIDE 5 MILLIGRAM(S): 5 TABLET ORAL at 23:45

## 2018-07-01 RX ADMIN — HEPARIN SODIUM 5000 UNIT(S): 5000 INJECTION INTRAVENOUS; SUBCUTANEOUS at 05:05

## 2018-07-01 RX ADMIN — LEVETIRACETAM 500 MILLIGRAM(S): 250 TABLET, FILM COATED ORAL at 05:05

## 2018-07-01 RX ADMIN — OXYCODONE HYDROCHLORIDE 5 MILLIGRAM(S): 5 TABLET ORAL at 23:15

## 2018-07-01 RX ADMIN — Medication 4 MILLIGRAM(S): at 17:15

## 2018-07-01 RX ADMIN — HEPARIN SODIUM 5000 UNIT(S): 5000 INJECTION INTRAVENOUS; SUBCUTANEOUS at 13:17

## 2018-07-01 RX ADMIN — BUDESONIDE AND FORMOTEROL FUMARATE DIHYDRATE 2 PUFF(S): 160; 4.5 AEROSOL RESPIRATORY (INHALATION) at 17:15

## 2018-07-01 RX ADMIN — LIDOCAINE 1 PATCH: 4 CREAM TOPICAL at 04:23

## 2018-07-01 NOTE — PROGRESS NOTE ADULT - SUBJECTIVE AND OBJECTIVE BOX
Patient is a 77y old  Female who presents with a chief complaint of weakness (2018 22:51)      INTERVAL HPI/OVERNIGHT EVENTS:    MEDICATIONS  (STANDING):  amLODIPine   Tablet 5 milliGRAM(s) Oral daily  buDESOnide 160 MICROgram(s)/formoterol 4.5 MICROgram(s) Inhaler 2 Puff(s) Inhalation two times a day  dexamethasone  Injectable 4 milliGRAM(s) IV Push every 6 hours  famotidine    Tablet 20 milliGRAM(s) Oral daily  heparin  Injectable 5000 Unit(s) SubCutaneous every 8 hours  levETIRAcetam 500 milliGRAM(s) Oral two times a day  levothyroxine 50 MICROGram(s) Oral daily  losartan 100 milliGRAM(s) Oral daily  multivitamin/minerals 1 Tablet(s) Oral daily  PARoxetine 20 milliGRAM(s) Oral daily    MEDICATIONS  (PRN):  oxyCODONE    IR 5 milliGRAM(s) Oral every 6 hours PRN Severe Pain (7 - 10)      Allergies    penicillin (Hives)    Intolerances        Vital Signs Last 24 Hrs  T(C): 36.6 (2018 08:54), Max: 36.8 (2018 02:33)  T(F): 97.9 (2018 08:54), Max: 98.3 (2018 02:33)  HR: 63 (2018 08:54) (62 - 78)  BP: 103/53 (2018 08:54) (76/49 - 142/70)  BP(mean): --  RR: 18 (2018 08:54) (18 - 18)  SpO2: 98% (2018 08:54) (96% - 98%)    LABS:                        8.6    8.97  )-----------( 532      ( 2018 08:36 )             28.5     07-01    139  |  100  |  20  ----------------------------<  148<H>  4.1   |  22  |  0.66    Ca    9.0      2018 08:43        Urinalysis Basic - ( 2018 14:56 )    Color: Yellow / Appearance: SL Turbid / S.013 / pH: x  Gluc: x / Ketone: Negative  / Bili: Negative / Urobili: Negative   Blood: x / Protein: Trace / Nitrite: Negative   Leuk Esterase: Large / RBC: 0-2 /HPF / WBC 25-50 /HPF   Sq Epi: x / Non Sq Epi: Few /HPF / Bacteria: Few /HPF        RADIOLOGY & ADDITIONAL TESTS:        Dr Narayanan [phone]

## 2018-07-01 NOTE — PROGRESS NOTE ADULT - ASSESSMENT
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  Dr Shakir Ferrer did FNA on the mediastinal nodes on 6-12-18. The pathology showed squamous cell carcinoma which is 5-10 % + PD-L1. Pt is qualified to receive Keytruda, an immunotherapy drug.    Pt agreed to chemotherapy and immunotherapy. Dr Shakir Ferrer will put a Mediport in pt.    Chemotherapy plans discussed with pt, pt's  and 2 sons, Shakir and Ananda.    Anemia: Adequate ferritin, B12 and folate levels. most likely anemia of chronic disease. Repeat in am.

## 2018-07-01 NOTE — PROGRESS NOTE ADULT - SUBJECTIVE AND OBJECTIVE BOX
Patient is a 77y old  Female who presents with a chief complaint of weakness (28 Jun 2018 22:51)       Pt is seen and examined  pt is awake and sitting up in bed talking to her friend  pt seems comfortable     MEDICATIONS  (STANDING):  amLODIPine   Tablet 5 milliGRAM(s) Oral daily  buDESOnide 160 MICROgram(s)/formoterol 4.5 MICROgram(s) Inhaler 2 Puff(s) Inhalation two times a day  dexamethasone  Injectable 4 milliGRAM(s) IV Push every 6 hours  famotidine    Tablet 20 milliGRAM(s) Oral daily  heparin  Injectable 5000 Unit(s) SubCutaneous every 8 hours  levETIRAcetam 500 milliGRAM(s) Oral two times a day  levothyroxine 50 MICROGram(s) Oral daily  losartan 100 milliGRAM(s) Oral daily  multivitamin/minerals 1 Tablet(s) Oral daily  PARoxetine 20 milliGRAM(s) Oral daily    MEDICATIONS  (PRN):  oxyCODONE    IR 5 milliGRAM(s) Oral every 6 hours PRN Severe Pain (7 - 10)      Allergies    penicillin (Hives)    Intolerances        Vital Signs Last 24 Hrs  T(C): 36.6 (01 Jul 2018 08:54), Max: 36.8 (01 Jul 2018 02:33)  T(F): 97.9 (01 Jul 2018 08:54), Max: 98.3 (01 Jul 2018 02:33)  HR: 63 (01 Jul 2018 08:54) (62 - 78)  BP: 103/53 (01 Jul 2018 08:54) (76/49 - 142/70)  BP(mean): --  RR: 18 (01 Jul 2018 08:54) (18 - 18)  SpO2: 98% (01 Jul 2018 08:54) (96% - 98%)        LABS:                          8.8    7.99  )-----------( 511      ( 01 Jul 2018 09:49 )             27.5         Mean Cell Volume : 79.5 fl  Mean Cell Hemoglobin : 25.4 pg  Mean Cell Hemoglobin Concentration : 32.0 gm/dL  Auto Neutrophil # : x  Auto Lymphocyte # : x  Auto Monocyte # : x  Auto Eosinophil # : x  Auto Basophil # : x  Auto Neutrophil % : x  Auto Lymphocyte % : x  Auto Monocyte % : x  Auto Eosinophil % : x  Auto Basophil % : x    Serial CBC's  07-01 @ 09:49  Hct-27.5 / Hgb-8.8 / Plat-511 / RBC-3.46 / WBC-7.99          Serial CBC's  06-30 @ 08:36  Hct-28.5 / Hgb-8.6 / Plat-532 / RBC-3.58 / WBC-8.97          Serial CBC's  06-28 @ 16:55  Hct-31.2 / Hgb-10.0 / Plat-412 / RBC-3.81 / WBC-7.8            07-01    139  |  100  |  20  ----------------------------<  148<H>  4.1   |  22  |  0.66    Ca    9.0      01 Jul 2018 08:43            WBC Count: 7.99 K/uL (07-01-18 @ 09:49)  Hemoglobin: 8.8 g/dL (07-01-18 @ 09:49)  Hematocrit: 27.5 % (07-01-18 @ 09:49)  Platelet Count - Automated: 511 K/uL (07-01-18 @ 09:49)  Ferritin, Serum: 127 ng/mL (07-01-18 @ 09:49)  Vitamin B12, Serum: 1198 pg/mL (07-01-18 @ 09:49)  Folate, Serum: 15.9 ng/mL (07-01-18 @ 09:49)  WBC Count: 8.97 K/uL (06-30-18 @ 08:36)  Hemoglobin: 8.6 g/dL (06-30-18 @ 08:36)  Hematocrit: 28.5 % (06-30-18 @ 08:36)  Platelet Count - Automated: 532 K/uL (06-30-18 @ 08:36)  WBC Count: 7.8 K/uL (06-28-18 @ 16:55)  Hemoglobin: 10.0 g/dL (06-28-18 @ 16:55)  Hematocrit: 31.2 % (06-28-18 @ 16:55)  Platelet Count - Automated: 412 K/uL (06-28-18 @ 16:55)

## 2018-07-01 NOTE — PROGRESS NOTE ADULT - ASSESSMENT
squamous  cell ca of lung with mets to brain- vasogenic edema-  ca l=of nose  with  reconstruction- breast  ca squamous  cell ca of lung with mets to brain- vasogenic edema-  ca  of nose  with  reconstruction- breast  ca  for  chemo for   met  lung  ca

## 2018-07-02 LAB
ANION GAP SERPL CALC-SCNC: 14 MMOL/L — SIGNIFICANT CHANGE UP (ref 5–17)
BUN SERPL-MCNC: 20 MG/DL — SIGNIFICANT CHANGE UP (ref 7–23)
CALCIUM SERPL-MCNC: 9.3 MG/DL — SIGNIFICANT CHANGE UP (ref 8.4–10.5)
CHLORIDE SERPL-SCNC: 100 MMOL/L — SIGNIFICANT CHANGE UP (ref 96–108)
CO2 SERPL-SCNC: 23 MMOL/L — SIGNIFICANT CHANGE UP (ref 22–31)
CREAT SERPL-MCNC: 0.69 MG/DL — SIGNIFICANT CHANGE UP (ref 0.5–1.3)
GLUCOSE SERPL-MCNC: 138 MG/DL — HIGH (ref 70–99)
HCT VFR BLD CALC: 28.2 % — LOW (ref 34.5–45)
HGB BLD-MCNC: 8.7 G/DL — LOW (ref 11.5–15.5)
MCHC RBC-ENTMCNC: 24.6 PG — LOW (ref 27–34)
MCHC RBC-ENTMCNC: 30.9 GM/DL — LOW (ref 32–36)
MCV RBC AUTO: 79.9 FL — LOW (ref 80–100)
PLATELET # BLD AUTO: 503 K/UL — HIGH (ref 150–400)
POTASSIUM SERPL-MCNC: 4.3 MMOL/L — SIGNIFICANT CHANGE UP (ref 3.5–5.3)
POTASSIUM SERPL-SCNC: 4.3 MMOL/L — SIGNIFICANT CHANGE UP (ref 3.5–5.3)
RBC # BLD: 3.53 M/UL — LOW (ref 3.8–5.2)
RBC # FLD: 18 % — HIGH (ref 10.3–14.5)
SODIUM SERPL-SCNC: 137 MMOL/L — SIGNIFICANT CHANGE UP (ref 135–145)
WBC # BLD: 6.44 K/UL — SIGNIFICANT CHANGE UP (ref 3.8–10.5)
WBC # FLD AUTO: 6.44 K/UL — SIGNIFICANT CHANGE UP (ref 3.8–10.5)

## 2018-07-02 RX ADMIN — BUDESONIDE AND FORMOTEROL FUMARATE DIHYDRATE 2 PUFF(S): 160; 4.5 AEROSOL RESPIRATORY (INHALATION) at 06:21

## 2018-07-02 RX ADMIN — HEPARIN SODIUM 5000 UNIT(S): 5000 INJECTION INTRAVENOUS; SUBCUTANEOUS at 21:16

## 2018-07-02 RX ADMIN — HEPARIN SODIUM 5000 UNIT(S): 5000 INJECTION INTRAVENOUS; SUBCUTANEOUS at 13:34

## 2018-07-02 RX ADMIN — LEVETIRACETAM 500 MILLIGRAM(S): 250 TABLET, FILM COATED ORAL at 17:21

## 2018-07-02 RX ADMIN — LEVETIRACETAM 500 MILLIGRAM(S): 250 TABLET, FILM COATED ORAL at 06:19

## 2018-07-02 RX ADMIN — BUDESONIDE AND FORMOTEROL FUMARATE DIHYDRATE 2 PUFF(S): 160; 4.5 AEROSOL RESPIRATORY (INHALATION) at 17:21

## 2018-07-02 RX ADMIN — Medication 50 MICROGRAM(S): at 06:19

## 2018-07-02 RX ADMIN — Medication 4 MILLIGRAM(S): at 06:15

## 2018-07-02 RX ADMIN — AMLODIPINE BESYLATE 5 MILLIGRAM(S): 2.5 TABLET ORAL at 12:45

## 2018-07-02 RX ADMIN — LOSARTAN POTASSIUM 100 MILLIGRAM(S): 100 TABLET, FILM COATED ORAL at 12:45

## 2018-07-02 RX ADMIN — Medication 4 MILLIGRAM(S): at 17:21

## 2018-07-02 RX ADMIN — HEPARIN SODIUM 5000 UNIT(S): 5000 INJECTION INTRAVENOUS; SUBCUTANEOUS at 06:17

## 2018-07-02 RX ADMIN — Medication 4 MILLIGRAM(S): at 12:45

## 2018-07-02 RX ADMIN — Medication 20 MILLIGRAM(S): at 12:46

## 2018-07-02 RX ADMIN — Medication 1 TABLET(S): at 12:45

## 2018-07-02 RX ADMIN — FAMOTIDINE 20 MILLIGRAM(S): 10 INJECTION INTRAVENOUS at 12:45

## 2018-07-02 NOTE — PROGRESS NOTE ADULT - SUBJECTIVE AND OBJECTIVE BOX
Patient is a 77y old  Female who presents with a chief complaint of weakness (28 Jun 2018 22:51)      INTERVAL HPI/OVERNIGHT EVENTS:    MEDICATIONS  (STANDING):  amLODIPine   Tablet 5 milliGRAM(s) Oral daily  buDESOnide 160 MICROgram(s)/formoterol 4.5 MICROgram(s) Inhaler 2 Puff(s) Inhalation two times a day  dexamethasone  Injectable 4 milliGRAM(s) IV Push every 6 hours  famotidine    Tablet 20 milliGRAM(s) Oral daily  heparin  Injectable 5000 Unit(s) SubCutaneous every 8 hours  levETIRAcetam 500 milliGRAM(s) Oral two times a day  levothyroxine 50 MICROGram(s) Oral daily  losartan 100 milliGRAM(s) Oral daily  multivitamin/minerals 1 Tablet(s) Oral daily  PARoxetine 20 milliGRAM(s) Oral daily    MEDICATIONS  (PRN):  oxyCODONE    IR 5 milliGRAM(s) Oral every 6 hours PRN Severe Pain (7 - 10)      Allergies    penicillin (Hives)    Intolerances        Vital Signs Last 24 Hrs  T(C): 36.7 (02 Jul 2018 06:04), Max: 36.7 (01 Jul 2018 22:25)  T(F): 98 (02 Jul 2018 06:04), Max: 98 (01 Jul 2018 22:25)  HR: 70 (02 Jul 2018 06:04) (69 - 79)  BP: 100/64 (02 Jul 2018 06:04) (75/50 - 100/64)  BP(mean): --  RR: 18 (02 Jul 2018 06:04) (18 - 18)  SpO2: 97% (02 Jul 2018 06:04) (95% - 99%)    LABS:                        8.7    6.44  )-----------( 503      ( 02 Jul 2018 09:29 )             28.2     07-02    137  |  100  |  20  ----------------------------<  138<H>  4.3   |  23  |  0.69    Ca    9.3      02 Jul 2018 07:31            RADIOLOGY & ADDITIONAL TESTS:        Dr Narayanan [phone]

## 2018-07-02 NOTE — PROGRESS NOTE ADULT - SUBJECTIVE AND OBJECTIVE BOX
Patient is a 77y old  Female who presents with a chief complaint of weakness (28 Jun 2018 22:51)       Pt is seen and examined  pt is awake and lying in bed  pt seems comfortable       MEDICATIONS  (STANDING):  amLODIPine   Tablet 5 milliGRAM(s) Oral daily  buDESOnide 160 MICROgram(s)/formoterol 4.5 MICROgram(s) Inhaler 2 Puff(s) Inhalation two times a day  dexamethasone  Injectable 4 milliGRAM(s) IV Push every 6 hours  famotidine    Tablet 20 milliGRAM(s) Oral daily  heparin  Injectable 5000 Unit(s) SubCutaneous every 8 hours  levETIRAcetam 500 milliGRAM(s) Oral two times a day  levothyroxine 50 MICROGram(s) Oral daily  losartan 100 milliGRAM(s) Oral daily  multivitamin/minerals 1 Tablet(s) Oral daily  PARoxetine 20 milliGRAM(s) Oral daily    MEDICATIONS  (PRN):  oxyCODONE    IR 5 milliGRAM(s) Oral every 6 hours PRN Severe Pain (7 - 10)      Allergies    penicillin (Hives)    Intolerances        Vital Signs Last 24 Hrs  T(C): 36.7 (02 Jul 2018 06:04), Max: 36.7 (01 Jul 2018 22:25)  T(F): 98 (02 Jul 2018 06:04), Max: 98 (01 Jul 2018 22:25)  HR: 70 (02 Jul 2018 06:04) (69 - 79)  BP: 100/64 (02 Jul 2018 06:04) (75/50 - 100/64)  BP(mean): --  RR: 18 (02 Jul 2018 06:04) (18 - 18)  SpO2: 97% (02 Jul 2018 06:04) (95% - 99%)        LABS:                          8.7    6.44  )-----------( 503      ( 02 Jul 2018 09:29 )             28.2         Mean Cell Volume : 79.9 fl  Mean Cell Hemoglobin : 24.6 pg  Mean Cell Hemoglobin Concentration : 30.9 gm/dL  Auto Neutrophil # : x  Auto Lymphocyte # : x  Auto Monocyte # : x  Auto Eosinophil # : x  Auto Basophil # : x  Auto Neutrophil % : x  Auto Lymphocyte % : x  Auto Monocyte % : x  Auto Eosinophil % : x  Auto Basophil % : x    Serial CBC's  07-02 @ 09:29  Hct-28.2 / Hgb-8.7 / Plat-503 / RBC-3.53 / WBC-6.44          Serial CBC's  07-01 @ 09:49  Hct-27.5 / Hgb-8.8 / Plat-511 / RBC-3.46 / WBC-7.99          Serial CBC's  06-30 @ 08:36  Hct-28.5 / Hgb-8.6 / Plat-532 / RBC-3.58 / WBC-8.97          Serial CBC's  06-28 @ 16:55  Hct-31.2 / Hgb-10.0 / Plat-412 / RBC-3.81 / WBC-7.8            07-02    137  |  100  |  20  ----------------------------<  138<H>  4.3   |  23  |  0.69    Ca    9.3      02 Jul 2018 07:31            WBC Count: 6.44 K/uL (07-02-18 @ 09:29)  Hemoglobin: 8.7 g/dL (07-02-18 @ 09:29)  Hematocrit: 28.2 % (07-02-18 @ 09:29)  Platelet Count - Automated: 503 K/uL (07-02-18 @ 09:29)  WBC Count: 7.99 K/uL (07-01-18 @ 09:49)  Hemoglobin: 8.8 g/dL (07-01-18 @ 09:49)  Hematocrit: 27.5 % (07-01-18 @ 09:49)  Platelet Count - Automated: 511 K/uL (07-01-18 @ 09:49)  Ferritin, Serum: 127 ng/mL (07-01-18 @ 09:49)  Vitamin B12, Serum: 1198 pg/mL (07-01-18 @ 09:49)  Folate, Serum: 15.9 ng/mL (07-01-18 @ 09:49)  WBC Count: 8.97 K/uL (06-30-18 @ 08:36)  Hemoglobin: 8.6 g/dL (06-30-18 @ 08:36)  Hematocrit: 28.5 % (06-30-18 @ 08:36)  Platelet Count - Automated: 532 K/uL (06-30-18 @ 08:36)  WBC Count: 7.8 K/uL (06-28-18 @ 16:55)  Hemoglobin: 10.0 g/dL (06-28-18 @ 16:55)  Hematocrit: 31.2 % (06-28-18 @ 16:55)  Platelet Count - Automated: 412 K/uL (06-28-18 @ 16:55)

## 2018-07-02 NOTE — PROGRESS NOTE ADULT - ASSESSMENT
squamous cell ca of lung with mets to  brain- for chem and immunotherapy- for  mediport  s/p bcc and  breast  ca

## 2018-07-02 NOTE — PROGRESS NOTE ADULT - ASSESSMENT
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  Dr Shakir Ferrer did FNA on the mediastinal nodes on 6-12-18. The pathology showed squamous cell carcinoma which is 5-10 % + PD-L1. Pt is qualified to receive Keytruda, an immunotherapy drug.    Pt agreed to chemotherapy and immunotherapy. Dr Shakir Ferrer will put a Mediport in pt.    Chemotherapy plans discussed with pt, pt's  and 2 sons, Shakir and Ananda.    Anemia: Adequate ferritin, B12 and folate levels. most likely anemia of chronic disease.  CBC stable. Repeat in am.

## 2018-07-03 LAB
HCT VFR BLD CALC: 30.6 % — LOW (ref 34.5–45)
HGB BLD-MCNC: 9.1 G/DL — LOW (ref 11.5–15.5)
MCHC RBC-ENTMCNC: 23.9 PG — LOW (ref 27–34)
MCHC RBC-ENTMCNC: 29.7 GM/DL — LOW (ref 32–36)
MCV RBC AUTO: 80.3 FL — SIGNIFICANT CHANGE UP (ref 80–100)
PLATELET # BLD AUTO: 581 K/UL — HIGH (ref 150–400)
RBC # BLD: 3.81 M/UL — SIGNIFICANT CHANGE UP (ref 3.8–5.2)
RBC # FLD: 18.3 % — HIGH (ref 10.3–14.5)
WBC # BLD: 6.6 K/UL — SIGNIFICANT CHANGE UP (ref 3.8–10.5)
WBC # FLD AUTO: 6.6 K/UL — SIGNIFICANT CHANGE UP (ref 3.8–10.5)

## 2018-07-03 RX ORDER — DIPHENHYDRAMINE HCL 50 MG
12.5 CAPSULE ORAL ONCE
Qty: 0 | Refills: 0 | Status: DISCONTINUED | OUTPATIENT
Start: 2018-07-03 | End: 2018-07-03

## 2018-07-03 RX ORDER — DIPHENHYDRAMINE HCL 50 MG
12.5 CAPSULE ORAL ONCE
Qty: 0 | Refills: 0 | Status: COMPLETED | OUTPATIENT
Start: 2018-07-03 | End: 2018-07-03

## 2018-07-03 RX ADMIN — LOSARTAN POTASSIUM 100 MILLIGRAM(S): 100 TABLET, FILM COATED ORAL at 05:40

## 2018-07-03 RX ADMIN — Medication 12.5 MILLIGRAM(S): at 01:15

## 2018-07-03 RX ADMIN — Medication 1 TABLET(S): at 12:37

## 2018-07-03 RX ADMIN — Medication 20 MILLIGRAM(S): at 12:37

## 2018-07-03 RX ADMIN — HEPARIN SODIUM 5000 UNIT(S): 5000 INJECTION INTRAVENOUS; SUBCUTANEOUS at 23:09

## 2018-07-03 RX ADMIN — LEVETIRACETAM 500 MILLIGRAM(S): 250 TABLET, FILM COATED ORAL at 17:45

## 2018-07-03 RX ADMIN — BUDESONIDE AND FORMOTEROL FUMARATE DIHYDRATE 2 PUFF(S): 160; 4.5 AEROSOL RESPIRATORY (INHALATION) at 17:45

## 2018-07-03 RX ADMIN — Medication 4 MILLIGRAM(S): at 00:11

## 2018-07-03 RX ADMIN — Medication 4 MILLIGRAM(S): at 23:09

## 2018-07-03 RX ADMIN — Medication 4 MILLIGRAM(S): at 05:40

## 2018-07-03 RX ADMIN — AMLODIPINE BESYLATE 5 MILLIGRAM(S): 2.5 TABLET ORAL at 05:40

## 2018-07-03 RX ADMIN — BUDESONIDE AND FORMOTEROL FUMARATE DIHYDRATE 2 PUFF(S): 160; 4.5 AEROSOL RESPIRATORY (INHALATION) at 06:01

## 2018-07-03 RX ADMIN — Medication 12.5 MILLIGRAM(S): at 23:28

## 2018-07-03 RX ADMIN — HEPARIN SODIUM 5000 UNIT(S): 5000 INJECTION INTRAVENOUS; SUBCUTANEOUS at 14:42

## 2018-07-03 RX ADMIN — LEVETIRACETAM 500 MILLIGRAM(S): 250 TABLET, FILM COATED ORAL at 05:40

## 2018-07-03 RX ADMIN — HEPARIN SODIUM 5000 UNIT(S): 5000 INJECTION INTRAVENOUS; SUBCUTANEOUS at 05:40

## 2018-07-03 RX ADMIN — OXYCODONE HYDROCHLORIDE 5 MILLIGRAM(S): 5 TABLET ORAL at 01:20

## 2018-07-03 RX ADMIN — FAMOTIDINE 20 MILLIGRAM(S): 10 INJECTION INTRAVENOUS at 12:36

## 2018-07-03 RX ADMIN — Medication 4 MILLIGRAM(S): at 14:40

## 2018-07-03 RX ADMIN — Medication 50 MICROGRAM(S): at 05:40

## 2018-07-03 RX ADMIN — OXYCODONE HYDROCHLORIDE 5 MILLIGRAM(S): 5 TABLET ORAL at 00:12

## 2018-07-03 NOTE — PROGRESS NOTE ADULT - ASSESSMENT
Metastatic  squamous cell  ca  of lung-brain mets-  bcc-breast  ca-  anemia  for  mediport  and  chemo and immunotherapy

## 2018-07-03 NOTE — PROGRESS NOTE ADULT - SUBJECTIVE AND OBJECTIVE BOX
Patient is a 77y old  Female who presents with a chief complaint of weakness (28 Jun 2018 22:51)       Pt is seen and examined  pt is awake and sitting in chair  pt seems comfortable         MEDICATIONS  (STANDING):  amLODIPine   Tablet 5 milliGRAM(s) Oral daily  buDESOnide 160 MICROgram(s)/formoterol 4.5 MICROgram(s) Inhaler 2 Puff(s) Inhalation two times a day  dexamethasone  Injectable 4 milliGRAM(s) IV Push every 6 hours  famotidine    Tablet 20 milliGRAM(s) Oral daily  heparin  Injectable 5000 Unit(s) SubCutaneous every 8 hours  levETIRAcetam 500 milliGRAM(s) Oral two times a day  levothyroxine 50 MICROGram(s) Oral daily  losartan 100 milliGRAM(s) Oral daily  multivitamin/minerals 1 Tablet(s) Oral daily  PARoxetine 20 milliGRAM(s) Oral daily    MEDICATIONS  (PRN):  oxyCODONE    IR 5 milliGRAM(s) Oral every 6 hours PRN Severe Pain (7 - 10)      Allergies    penicillin (Hives)    Intolerances        Vital Signs Last 24 Hrs  T(C): 36.6 (03 Jul 2018 08:48), Max: 36.6 (02 Jul 2018 20:50)  T(F): 97.8 (03 Jul 2018 08:48), Max: 97.8 (02 Jul 2018 20:50)  HR: 81 (03 Jul 2018 08:48) (73 - 81)  BP: 100/63 (03 Jul 2018 08:48) (94/52 - 126/68)  BP(mean): --  RR: 18 (03 Jul 2018 08:48) (18 - 18)  SpO2: 98% (03 Jul 2018 08:48) (95% - 98%)        LABS:                          9.1    6.60  )-----------( 581      ( 03 Jul 2018 11:31 )             30.6         Mean Cell Volume : 80.3 fl  Mean Cell Hemoglobin : 23.9 pg  Mean Cell Hemoglobin Concentration : 29.7 gm/dL  Auto Neutrophil # : x  Auto Lymphocyte # : x  Auto Monocyte # : x  Auto Eosinophil # : x  Auto Basophil # : x  Auto Neutrophil % : x  Auto Lymphocyte % : x  Auto Monocyte % : x  Auto Eosinophil % : x  Auto Basophil % : x    Serial CBC's  07-03 @ 11:31  Hct-30.6 / Hgb-9.1 / Plat-581 / RBC-3.81 / WBC-6.60          Serial CBC's  07-02 @ 09:29  Hct-28.2 / Hgb-8.7 / Plat-503 / RBC-3.53 / WBC-6.44          Serial CBC's  07-01 @ 09:49  Hct-27.5 / Hgb-8.8 / Plat-511 / RBC-3.46 / WBC-7.99          Serial CBC's  06-30 @ 08:36  Hct-28.5 / Hgb-8.6 / Plat-532 / RBC-3.58 / WBC-8.97            07-02    137  |  100  |  20  ----------------------------<  138<H>  4.3   |  23  |  0.69    Ca    9.3      02 Jul 2018 07:31            WBC Count: 6.60 K/uL (07-03-18 @ 11:31)  Hemoglobin: 9.1 g/dL (07-03-18 @ 11:31)  Hematocrit: 30.6 % (07-03-18 @ 11:31)  Platelet Count - Automated: 581 K/uL (07-03-18 @ 11:31)  WBC Count: 6.44 K/uL (07-02-18 @ 09:29)  Hemoglobin: 8.7 g/dL (07-02-18 @ 09:29)  Hematocrit: 28.2 % (07-02-18 @ 09:29)  Platelet Count - Automated: 503 K/uL (07-02-18 @ 09:29)  WBC Count: 7.99 K/uL (07-01-18 @ 09:49)  Hemoglobin: 8.8 g/dL (07-01-18 @ 09:49)  Hematocrit: 27.5 % (07-01-18 @ 09:49)  Platelet Count - Automated: 511 K/uL (07-01-18 @ 09:49)  Ferritin, Serum: 127 ng/mL (07-01-18 @ 09:49)  Vitamin B12, Serum: 1198 pg/mL (07-01-18 @ 09:49)  Folate, Serum: 15.9 ng/mL (07-01-18 @ 09:49)  WBC Count: 8.97 K/uL (06-30-18 @ 08:36)  Hemoglobin: 8.6 g/dL (06-30-18 @ 08:36)  Hematocrit: 28.5 % (06-30-18 @ 08:36)  Platelet Count - Automated: 532 K/uL (06-30-18 @ 08:36)  WBC Count: 7.8 K/uL (06-28-18 @ 16:55)  Hemoglobin: 10.0 g/dL (06-28-18 @ 16:55)  Hematocrit: 31.2 % (06-28-18 @ 16:55)  Platelet Count - Automated: 412 K/uL (06-28-18 @ 16:55)

## 2018-07-03 NOTE — PROGRESS NOTE ADULT - SUBJECTIVE AND OBJECTIVE BOX
Patient is a 77y old  Female who presents with a chief complaint of weakness (28 Jun 2018 22:51)      INTERVAL HPI/OVERNIGHT EVENTS:    MEDICATIONS  (STANDING):  amLODIPine   Tablet 5 milliGRAM(s) Oral daily  buDESOnide 160 MICROgram(s)/formoterol 4.5 MICROgram(s) Inhaler 2 Puff(s) Inhalation two times a day  dexamethasone  Injectable 4 milliGRAM(s) IV Push every 6 hours  famotidine    Tablet 20 milliGRAM(s) Oral daily  heparin  Injectable 5000 Unit(s) SubCutaneous every 8 hours  levETIRAcetam 500 milliGRAM(s) Oral two times a day  levothyroxine 50 MICROGram(s) Oral daily  losartan 100 milliGRAM(s) Oral daily  multivitamin/minerals 1 Tablet(s) Oral daily  PARoxetine 20 milliGRAM(s) Oral daily    MEDICATIONS  (PRN):  oxyCODONE    IR 5 milliGRAM(s) Oral every 6 hours PRN Severe Pain (7 - 10)      Allergies    penicillin (Hives)    Intolerances        Vital Signs Last 24 Hrs  T(C): 36.6 (03 Jul 2018 08:48), Max: 36.6 (02 Jul 2018 15:35)  T(F): 97.8 (03 Jul 2018 08:48), Max: 97.9 (02 Jul 2018 15:35)  HR: 81 (03 Jul 2018 08:48) (72 - 81)  BP: 100/63 (03 Jul 2018 08:48) (94/52 - 126/68)  BP(mean): --  RR: 18 (03 Jul 2018 08:48) (18 - 18)  SpO2: 98% (03 Jul 2018 08:48) (95% - 98%)    LABS:                        8.7    6.44  )-----------( 503      ( 02 Jul 2018 09:29 )             28.2     07-02    137  |  100  |  20  ----------------------------<  138<H>  4.3   |  23  |  0.69    Ca    9.3      02 Jul 2018 07:31            RADIOLOGY & ADDITIONAL TESTS:        Dr Narayanan [phone]

## 2018-07-04 ENCOUNTER — TRANSCRIPTION ENCOUNTER (OUTPATIENT)
Age: 78
End: 2018-07-04

## 2018-07-04 LAB
ANION GAP SERPL CALC-SCNC: 15 MMOL/L — SIGNIFICANT CHANGE UP (ref 5–17)
APPEARANCE UR: CLEAR — SIGNIFICANT CHANGE UP
BILIRUB UR-MCNC: NEGATIVE — SIGNIFICANT CHANGE UP
BLD GP AB SCN SERPL QL: NEGATIVE — SIGNIFICANT CHANGE UP
BUN SERPL-MCNC: 24 MG/DL — HIGH (ref 7–23)
CALCIUM SERPL-MCNC: 9 MG/DL — SIGNIFICANT CHANGE UP (ref 8.4–10.5)
CHLORIDE SERPL-SCNC: 101 MMOL/L — SIGNIFICANT CHANGE UP (ref 96–108)
CO2 SERPL-SCNC: 16 MMOL/L — LOW (ref 22–31)
COLOR SPEC: SIGNIFICANT CHANGE UP
CREAT SERPL-MCNC: 0.62 MG/DL — SIGNIFICANT CHANGE UP (ref 0.5–1.3)
DIFF PNL FLD: NEGATIVE — SIGNIFICANT CHANGE UP
GLUCOSE SERPL-MCNC: 131 MG/DL — HIGH (ref 70–99)
GLUCOSE UR QL: NEGATIVE — SIGNIFICANT CHANGE UP
HCT VFR BLD CALC: 32.1 % — LOW (ref 34.5–45)
HGB BLD-MCNC: 9.8 G/DL — LOW (ref 11.5–15.5)
KETONES UR-MCNC: NEGATIVE — SIGNIFICANT CHANGE UP
LEUKOCYTE ESTERASE UR-ACNC: ABNORMAL
MCHC RBC-ENTMCNC: 24.2 PG — LOW (ref 27–34)
MCHC RBC-ENTMCNC: 30.5 GM/DL — LOW (ref 32–36)
MCV RBC AUTO: 79.3 FL — LOW (ref 80–100)
NITRITE UR-MCNC: NEGATIVE — SIGNIFICANT CHANGE UP
PH UR: 7 — SIGNIFICANT CHANGE UP (ref 5–8)
PLATELET # BLD AUTO: 368 K/UL — SIGNIFICANT CHANGE UP (ref 150–400)
POTASSIUM SERPL-MCNC: 4.5 MMOL/L — SIGNIFICANT CHANGE UP (ref 3.5–5.3)
POTASSIUM SERPL-SCNC: 4.5 MMOL/L — SIGNIFICANT CHANGE UP (ref 3.5–5.3)
PROT UR-MCNC: NEGATIVE — SIGNIFICANT CHANGE UP
RBC # BLD: 4.05 M/UL — SIGNIFICANT CHANGE UP (ref 3.8–5.2)
RBC # FLD: 18.4 % — HIGH (ref 10.3–14.5)
RH IG SCN BLD-IMP: POSITIVE — SIGNIFICANT CHANGE UP
SODIUM SERPL-SCNC: 132 MMOL/L — LOW (ref 135–145)
SP GR SPEC: 1.01 — SIGNIFICANT CHANGE UP (ref 1.01–1.02)
UROBILINOGEN FLD QL: NEGATIVE — SIGNIFICANT CHANGE UP
WBC # BLD: 7.45 K/UL — SIGNIFICANT CHANGE UP (ref 3.8–10.5)
WBC # FLD AUTO: 7.45 K/UL — SIGNIFICANT CHANGE UP (ref 3.8–10.5)

## 2018-07-04 RX ORDER — DEXAMETHASONE 0.5 MG/5ML
4 ELIXIR ORAL THREE TIMES A DAY
Qty: 0 | Refills: 0 | Status: DISCONTINUED | OUTPATIENT
Start: 2018-07-04 | End: 2018-07-06

## 2018-07-04 RX ADMIN — BUDESONIDE AND FORMOTEROL FUMARATE DIHYDRATE 2 PUFF(S): 160; 4.5 AEROSOL RESPIRATORY (INHALATION) at 06:13

## 2018-07-04 RX ADMIN — HEPARIN SODIUM 5000 UNIT(S): 5000 INJECTION INTRAVENOUS; SUBCUTANEOUS at 06:12

## 2018-07-04 RX ADMIN — LEVETIRACETAM 500 MILLIGRAM(S): 250 TABLET, FILM COATED ORAL at 06:13

## 2018-07-04 RX ADMIN — Medication 4 MILLIGRAM(S): at 13:30

## 2018-07-04 RX ADMIN — Medication 50 MICROGRAM(S): at 06:13

## 2018-07-04 RX ADMIN — Medication 4 MILLIGRAM(S): at 06:13

## 2018-07-04 RX ADMIN — Medication 1 TABLET(S): at 13:32

## 2018-07-04 RX ADMIN — Medication 4 MILLIGRAM(S): at 22:06

## 2018-07-04 RX ADMIN — LOSARTAN POTASSIUM 100 MILLIGRAM(S): 100 TABLET, FILM COATED ORAL at 06:13

## 2018-07-04 RX ADMIN — BUDESONIDE AND FORMOTEROL FUMARATE DIHYDRATE 2 PUFF(S): 160; 4.5 AEROSOL RESPIRATORY (INHALATION) at 17:19

## 2018-07-04 RX ADMIN — Medication 4 MILLIGRAM(S): at 17:18

## 2018-07-04 RX ADMIN — HEPARIN SODIUM 5000 UNIT(S): 5000 INJECTION INTRAVENOUS; SUBCUTANEOUS at 13:30

## 2018-07-04 RX ADMIN — FAMOTIDINE 20 MILLIGRAM(S): 10 INJECTION INTRAVENOUS at 13:30

## 2018-07-04 RX ADMIN — Medication 20 MILLIGRAM(S): at 15:43

## 2018-07-04 RX ADMIN — LEVETIRACETAM 500 MILLIGRAM(S): 250 TABLET, FILM COATED ORAL at 17:18

## 2018-07-04 RX ADMIN — AMLODIPINE BESYLATE 5 MILLIGRAM(S): 2.5 TABLET ORAL at 06:13

## 2018-07-04 RX ADMIN — HEPARIN SODIUM 5000 UNIT(S): 5000 INJECTION INTRAVENOUS; SUBCUTANEOUS at 22:06

## 2018-07-04 NOTE — PROGRESS NOTE ADULT - ASSESSMENT
Squamous  cell ca  with mets to  brain- basal cell ca  of nose s/p  reconstruction-breast  ca  -copd  for  chemo  -for  mediport Squamous  cell ca  with mets to  brain- basal cell ca  of nose s/p  reconstruction-breast  ca  -copd-mood disorder - htn   for  chemo  -for  mediport

## 2018-07-04 NOTE — PROGRESS NOTE ADULT - ASSESSMENT
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  Dr Shakir Ferrer did FNA on the mediastinal nodes on 18. The pathology showed squamous cell carcinoma which is 5-10 % + PD-L1. Pt is qualified to receive Keytruda, an immunotherapy drug.    Pt agreed to chemotherapy and immunotherapy. Dr Shakir Ferrer will put a Mediport in pt.    Chemotherapy plans discussed with pt, pt's  and 2 sons, Shakir and Ananda.    Anemia: Adequate ferritin, B12 and folate levels. most likely anemia of chronic disease.  CBC stable. Repeat in am.    Change decadron from IV to PO with  dosage to 4 mg TID.

## 2018-07-04 NOTE — PROGRESS NOTE ADULT - NEGATIVE CARDIOVASCULAR SYMPTOMS
no palpitations/no chest pain
no chest pain/no palpitations
no palpitations/no chest pain
no chest pain/no palpitations
no palpitations/no chest pain

## 2018-07-04 NOTE — PROGRESS NOTE ADULT - SUBJECTIVE AND OBJECTIVE BOX
Patient is a 77y old  Female who presents with a chief complaint of weakness (28 Jun 2018 22:51)      INTERVAL HPI/OVERNIGHT EVENTS:    MEDICATIONS  (STANDING):  amLODIPine   Tablet 5 milliGRAM(s) Oral daily  buDESOnide 160 MICROgram(s)/formoterol 4.5 MICROgram(s) Inhaler 2 Puff(s) Inhalation two times a day  dexamethasone  Injectable 4 milliGRAM(s) IV Push every 6 hours  famotidine    Tablet 20 milliGRAM(s) Oral daily  heparin  Injectable 5000 Unit(s) SubCutaneous every 8 hours  levETIRAcetam 500 milliGRAM(s) Oral two times a day  levothyroxine 50 MICROGram(s) Oral daily  losartan 100 milliGRAM(s) Oral daily  multivitamin/minerals 1 Tablet(s) Oral daily  PARoxetine 20 milliGRAM(s) Oral daily    MEDICATIONS  (PRN):  oxyCODONE    IR 5 milliGRAM(s) Oral every 6 hours PRN Severe Pain (7 - 10)      Allergies    penicillin (Hives)    Intolerances        Vital Signs Last 24 Hrs  T(C): 36.6 (04 Jul 2018 01:30), Max: 36.6 (03 Jul 2018 08:48)  T(F): 97.9 (04 Jul 2018 01:30), Max: 97.9 (04 Jul 2018 01:30)  HR: 79 (04 Jul 2018 01:30) (68 - 85)  BP: 135/66 (04 Jul 2018 01:30) (100/63 - 135/66)  BP(mean): --  RR: 18 (04 Jul 2018 01:30) (18 - 18)  SpO2: 97% (04 Jul 2018 01:30) (95% - 98%)    LABS:                        9.1    6.60  )-----------( 581      ( 03 Jul 2018 11:31 )             30.6     07-02    137  |  100  |  20  ----------------------------<  138<H>  4.3   |  23  |  0.69    Ca    9.3      02 Jul 2018 07:31            RADIOLOGY & ADDITIONAL TESTS:        Dr Narayanan [phone] Patient is a 77y old  Female who presents with a chief complaint of weakness (28 Jun 2018 22:51)  More  awake and alert-  oriented  x3    INTERVAL HPI/OVERNIGHT EVENTS:    MEDICATIONS  (STANDING):  amLODIPine   Tablet 5 milliGRAM(s) Oral daily  buDESOnide 160 MICROgram(s)/formoterol 4.5 MICROgram(s) Inhaler 2 Puff(s) Inhalation two times a day  dexamethasone  Injectable 4 milliGRAM(s) IV Push every 6 hours  famotidine    Tablet 20 milliGRAM(s) Oral daily  heparin  Injectable 5000 Unit(s) SubCutaneous every 8 hours  levETIRAcetam 500 milliGRAM(s) Oral two times a day  levothyroxine 50 MICROGram(s) Oral daily  losartan 100 milliGRAM(s) Oral daily  multivitamin/minerals 1 Tablet(s) Oral daily  PARoxetine 20 milliGRAM(s) Oral daily    MEDICATIONS  (PRN):  oxyCODONE    IR 5 milliGRAM(s) Oral every 6 hours PRN Severe Pain (7 - 10)      Allergies    penicillin (Hives)    Intolerances        Vital Signs Last 24 Hrs  T(C): 36.6 (04 Jul 2018 01:30), Max: 36.6 (03 Jul 2018 08:48)  T(F): 97.9 (04 Jul 2018 01:30), Max: 97.9 (04 Jul 2018 01:30)  HR: 79 (04 Jul 2018 01:30) (68 - 85)  BP: 135/66 (04 Jul 2018 01:30) (100/63 - 135/66)  BP(mean): --  RR: 18 (04 Jul 2018 01:30) (18 - 18)  SpO2: 97% (04 Jul 2018 01:30) (95% - 98%)    LABS:                        9.1    6.60  )-----------( 581      ( 03 Jul 2018 11:31 )             30.6     07-02    137  |  100  |  20  ----------------------------<  138<H>  4.3   |  23  |  0.69    Ca    9.3      02 Jul 2018 07:31            RADIOLOGY & ADDITIONAL TESTS:        Dr Narayanan [phone]

## 2018-07-04 NOTE — PROGRESS NOTE ADULT - SUBJECTIVE AND OBJECTIVE BOX
Patient is a 77y old  Female who presents with a chief complaint of weakness (28 Jun 2018 22:51)       Pt is seen and examined  pt is awake and sitting up in bed  pt seems comfortable       MEDICATIONS  (STANDING):  amLODIPine   Tablet 5 milliGRAM(s) Oral daily  buDESOnide 160 MICROgram(s)/formoterol 4.5 MICROgram(s) Inhaler 2 Puff(s) Inhalation two times a day  dexamethasone     Tablet 4 milliGRAM(s) Oral three times a day  famotidine    Tablet 20 milliGRAM(s) Oral daily  heparin  Injectable 5000 Unit(s) SubCutaneous every 8 hours  levETIRAcetam 500 milliGRAM(s) Oral two times a day  levothyroxine 50 MICROGram(s) Oral daily  losartan 100 milliGRAM(s) Oral daily  multivitamin/minerals 1 Tablet(s) Oral daily  PARoxetine 20 milliGRAM(s) Oral daily    MEDICATIONS  (PRN):  oxyCODONE    IR 5 milliGRAM(s) Oral every 6 hours PRN Severe Pain (7 - 10)      Allergies    penicillin (Hives)            Vital Signs Last 24 Hrs  T(C): 36.6 (04 Jul 2018 07:37), Max: 36.6 (04 Jul 2018 01:30)  T(F): 97.9 (04 Jul 2018 07:37), Max: 97.9 (04 Jul 2018 01:30)  HR: 66 (04 Jul 2018 07:37) (66 - 79)  BP: 130/76 (04 Jul 2018 15:51) (130/76 - 135/66)  BP(mean): --  RR: 18 (04 Jul 2018 15:51) (18 - 18)  SpO2: 99% (04 Jul 2018 15:51) (96% - 99%)        LABS:                          9.8    7.45  )-----------( 368      ( 04 Jul 2018 10:35 )             32.1         Mean Cell Volume : 79.3 fl  Mean Cell Hemoglobin : 24.2 pg  Mean Cell Hemoglobin Concentration : 30.5 gm/dL  Auto Neutrophil # : x  Auto Lymphocyte # : x  Auto Monocyte # : x  Auto Eosinophil # : x  Auto Basophil # : x  Auto Neutrophil % : x  Auto Lymphocyte % : x  Auto Monocyte % : x  Auto Eosinophil % : x  Auto Basophil % : x    Serial CBC's  07-04 @ 10:35  Hct-32.1 / Hgb-9.8 / Plat-368 / RBC-4.05 / WBC-7.45          Serial CBC's  07-03 @ 11:31  Hct-30.6 / Hgb-9.1 / Plat-581 / RBC-3.81 / WBC-6.60          Serial CBC's  07-02 @ 09:29  Hct-28.2 / Hgb-8.7 / Plat-503 / RBC-3.53 / WBC-6.44          Serial CBC's  07-01 @ 09:49  Hct-27.5 / Hgb-8.8 / Plat-511 / RBC-3.46 / WBC-7.99            07-04    132<L>  |  101  |  24<H>  ----------------------------<  131<H>  4.5   |  16<L>  |  0.62    Ca    9.0      04 Jul 2018 07:24            WBC Count: 7.45 K/uL (07-04-18 @ 10:35)  Hemoglobin: 9.8 g/dL (07-04-18 @ 10:35)  Hematocrit: 32.1 % (07-04-18 @ 10:35)  Platelet Count - Automated: 368 K/uL (07-04-18 @ 10:35)  WBC Count: 6.60 K/uL (07-03-18 @ 11:31)  Hemoglobin: 9.1 g/dL (07-03-18 @ 11:31)  Hematocrit: 30.6 % (07-03-18 @ 11:31)  Platelet Count - Automated: 581 K/uL (07-03-18 @ 11:31)  WBC Count: 6.44 K/uL (07-02-18 @ 09:29)  Hemoglobin: 8.7 g/dL (07-02-18 @ 09:29)  Hematocrit: 28.2 % (07-02-18 @ 09:29)  Platelet Count - Automated: 503 K/uL (07-02-18 @ 09:29)  WBC Count: 7.99 K/uL (07-01-18 @ 09:49)  Hemoglobin: 8.8 g/dL (07-01-18 @ 09:49)  Hematocrit: 27.5 % (07-01-18 @ 09:49)  Platelet Count - Automated: 511 K/uL (07-01-18 @ 09:49)  Ferritin, Serum: 127 ng/mL (07-01-18 @ 09:49)  Vitamin B12, Serum: 1198 pg/mL (07-01-18 @ 09:49)  Folate, Serum: 15.9 ng/mL (07-01-18 @ 09:49)  WBC Count: 8.97 K/uL (06-30-18 @ 08:36)  Hemoglobin: 8.6 g/dL (06-30-18 @ 08:36)  Hematocrit: 28.5 % (06-30-18 @ 08:36)  Platelet Count - Automated: 532 K/uL (06-30-18 @ 08:36)  WBC Count: 7.8 K/uL (06-28-18 @ 16:55)  Hemoglobin: 10.0 g/dL (06-28-18 @ 16:55)  Hematocrit: 31.2 % (06-28-18 @ 16:55)  Platelet Count - Automated: 412 K/uL (06-28-18 @ 16:55)

## 2018-07-05 ENCOUNTER — TRANSCRIPTION ENCOUNTER (OUTPATIENT)
Age: 78
End: 2018-07-05

## 2018-07-05 LAB
ANION GAP SERPL CALC-SCNC: 18 MMOL/L — HIGH (ref 5–17)
BUN SERPL-MCNC: 24 MG/DL — HIGH (ref 7–23)
CALCIUM SERPL-MCNC: 8.8 MG/DL — SIGNIFICANT CHANGE UP (ref 8.4–10.5)
CHLORIDE SERPL-SCNC: 98 MMOL/L — SIGNIFICANT CHANGE UP (ref 96–108)
CO2 SERPL-SCNC: 17 MMOL/L — LOW (ref 22–31)
CREAT SERPL-MCNC: 0.65 MG/DL — SIGNIFICANT CHANGE UP (ref 0.5–1.3)
GLUCOSE SERPL-MCNC: 131 MG/DL — HIGH (ref 70–99)
HCT VFR BLD CALC: 30.3 % — LOW (ref 34.5–45)
HGB BLD-MCNC: 9.5 G/DL — LOW (ref 11.5–15.5)
MCHC RBC-ENTMCNC: 24.6 PG — LOW (ref 27–34)
MCHC RBC-ENTMCNC: 31.4 GM/DL — LOW (ref 32–36)
MCV RBC AUTO: 78.5 FL — LOW (ref 80–100)
PLATELET # BLD AUTO: 498 K/UL — HIGH (ref 150–400)
POTASSIUM SERPL-MCNC: 4.5 MMOL/L — SIGNIFICANT CHANGE UP (ref 3.5–5.3)
POTASSIUM SERPL-SCNC: 4.5 MMOL/L — SIGNIFICANT CHANGE UP (ref 3.5–5.3)
RBC # BLD: 3.86 M/UL — SIGNIFICANT CHANGE UP (ref 3.8–5.2)
RBC # FLD: 18.6 % — HIGH (ref 10.3–14.5)
SODIUM SERPL-SCNC: 133 MMOL/L — LOW (ref 135–145)
WBC # BLD: 9.05 K/UL — SIGNIFICANT CHANGE UP (ref 3.8–10.5)
WBC # FLD AUTO: 9.05 K/UL — SIGNIFICANT CHANGE UP (ref 3.8–10.5)

## 2018-07-05 PROCEDURE — 36561 INSERT TUNNELED CV CATH: CPT

## 2018-07-05 PROCEDURE — 71045 X-RAY EXAM CHEST 1 VIEW: CPT | Mod: 26

## 2018-07-05 PROCEDURE — 77001 FLUOROGUIDE FOR VEIN DEVICE: CPT | Mod: 26

## 2018-07-05 RX ORDER — OXYCODONE AND ACETAMINOPHEN 5; 325 MG/1; MG/1
1 TABLET ORAL EVERY 4 HOURS
Qty: 0 | Refills: 0 | Status: DISCONTINUED | OUTPATIENT
Start: 2018-07-05 | End: 2018-07-05

## 2018-07-05 RX ORDER — ONDANSETRON 8 MG/1
4 TABLET, FILM COATED ORAL ONCE
Qty: 0 | Refills: 0 | Status: DISCONTINUED | OUTPATIENT
Start: 2018-07-05 | End: 2018-07-05

## 2018-07-05 RX ORDER — SODIUM CHLORIDE 9 MG/ML
1000 INJECTION INTRAMUSCULAR; INTRAVENOUS; SUBCUTANEOUS
Qty: 0 | Refills: 0 | Status: COMPLETED | OUTPATIENT
Start: 2018-07-05 | End: 2018-07-05

## 2018-07-05 RX ADMIN — Medication 4 MILLIGRAM(S): at 15:21

## 2018-07-05 RX ADMIN — SODIUM CHLORIDE 50 MILLILITER(S): 9 INJECTION INTRAMUSCULAR; INTRAVENOUS; SUBCUTANEOUS at 12:11

## 2018-07-05 RX ADMIN — LEVETIRACETAM 500 MILLIGRAM(S): 250 TABLET, FILM COATED ORAL at 05:23

## 2018-07-05 RX ADMIN — HEPARIN SODIUM 5000 UNIT(S): 5000 INJECTION INTRAVENOUS; SUBCUTANEOUS at 05:23

## 2018-07-05 RX ADMIN — Medication 1 TABLET(S): at 12:12

## 2018-07-05 RX ADMIN — BUDESONIDE AND FORMOTEROL FUMARATE DIHYDRATE 2 PUFF(S): 160; 4.5 AEROSOL RESPIRATORY (INHALATION) at 05:24

## 2018-07-05 RX ADMIN — HEPARIN SODIUM 5000 UNIT(S): 5000 INJECTION INTRAVENOUS; SUBCUTANEOUS at 15:21

## 2018-07-05 RX ADMIN — Medication 4 MILLIGRAM(S): at 21:14

## 2018-07-05 RX ADMIN — Medication 4 MILLIGRAM(S): at 05:23

## 2018-07-05 RX ADMIN — BUDESONIDE AND FORMOTEROL FUMARATE DIHYDRATE 2 PUFF(S): 160; 4.5 AEROSOL RESPIRATORY (INHALATION) at 18:16

## 2018-07-05 RX ADMIN — LOSARTAN POTASSIUM 100 MILLIGRAM(S): 100 TABLET, FILM COATED ORAL at 05:23

## 2018-07-05 RX ADMIN — Medication 50 MICROGRAM(S): at 05:25

## 2018-07-05 RX ADMIN — Medication 20 MILLIGRAM(S): at 12:12

## 2018-07-05 RX ADMIN — HEPARIN SODIUM 5000 UNIT(S): 5000 INJECTION INTRAVENOUS; SUBCUTANEOUS at 21:14

## 2018-07-05 RX ADMIN — LEVETIRACETAM 500 MILLIGRAM(S): 250 TABLET, FILM COATED ORAL at 18:17

## 2018-07-05 RX ADMIN — AMLODIPINE BESYLATE 5 MILLIGRAM(S): 2.5 TABLET ORAL at 05:23

## 2018-07-05 RX ADMIN — FAMOTIDINE 20 MILLIGRAM(S): 10 INJECTION INTRAVENOUS at 12:12

## 2018-07-05 NOTE — BRIEF OPERATIVE NOTE - PRE-OP DX
Small cell carcinoma of lung  07/05/2018    Active  Lucila Hopkins Squamous cell carcinoma of lung, unspecified laterality  07/05/2018    Active  Twila Krishnan

## 2018-07-05 NOTE — DISCHARGE NOTE ADULT - SECONDARY DIAGNOSIS.
Essential hypertension Depression, unspecified depression type Chronic obstructive pulmonary disease, unspecified COPD type Brain metastases Urinary tract infection without hematuria, site unspecified

## 2018-07-05 NOTE — DISCHARGE NOTE ADULT - CARE PROVIDER_API CALL
Carin Marin), Medical Oncology  [facility] [place] [address] [address]  [place] [address]  Phone: [phone]  Fax: [phone] Carin Marin), Medical Oncology  [address] [place] [address] [address] [place] [address]  Phone: [phone]  Fax: [phone]    Natalie George), Neurology  [facility]  [address] [place] [address]  Phone: [phone]  Fax: [phone]    Jairo Rm (DO), Family Medicine  [address] [place] [address]  Phone: [phone]  Fax: [phone]

## 2018-07-05 NOTE — PROGRESS NOTE ADULT - NEGATIVE GASTROINTESTINAL SYMPTOMS
no nausea/no vomiting/no diarrhea
no nausea/no diarrhea/no vomiting
no vomiting/no nausea/no diarrhea
no diarrhea/no nausea/no vomiting
no vomiting/no diarrhea/no nausea
no nausea/no vomiting/no diarrhea/no abdominal pain

## 2018-07-05 NOTE — PROGRESS NOTE ADULT - ASSESSMENT
metastatic  squamous  cell cancer  - with   brain mets and edema-bcc-  hx  breast   ca   for  mediport and consider  tapering  steroids per neurolgy

## 2018-07-05 NOTE — DISCHARGE NOTE ADULT - PATIENT PORTAL LINK FT
You can access the [facility], offered by [facility], by registering with the following website: [email or web address]

## 2018-07-05 NOTE — PROGRESS NOTE ADULT - SUBJECTIVE AND OBJECTIVE BOX
Patient is a 77y old  Female who presents with a chief complaint of weakness (28 Jun 2018 22:51)       Pt is seen and examined  pt is awake and sitting in chair  pt seems comfortable and denies any complaints at this time      MEDICATIONS  (STANDING):  amLODIPine   Tablet 5 milliGRAM(s) Oral daily  buDESOnide 160 MICROgram(s)/formoterol 4.5 MICROgram(s) Inhaler 2 Puff(s) Inhalation two times a day  dexamethasone     Tablet 4 milliGRAM(s) Oral three times a day  famotidine    Tablet 20 milliGRAM(s) Oral daily  heparin  Injectable 5000 Unit(s) SubCutaneous every 8 hours  levETIRAcetam 500 milliGRAM(s) Oral two times a day  levothyroxine 50 MICROGram(s) Oral daily  losartan 100 milliGRAM(s) Oral daily  multivitamin/minerals 1 Tablet(s) Oral daily  PARoxetine 20 milliGRAM(s) Oral daily    MEDICATIONS  (PRN):  oxyCODONE    IR 5 milliGRAM(s) Oral every 6 hours PRN Severe Pain (7 - 10)      Allergies    penicillin (Hives)    Intolerances        Vital Signs Last 24 Hrs  T(C): 36.7 (05 Jul 2018 11:00), Max: 37.3 (05 Jul 2018 09:30)  T(F): 98 (05 Jul 2018 11:00), Max: 99.1 (05 Jul 2018 09:30)  HR: 74 (05 Jul 2018 11:00) (64 - 77)  BP: 124/63 (05 Jul 2018 11:00) (114/656 - 169/74)  BP(mean): 91 (05 Jul 2018 09:30) (91 - 107)  RR: 18 (05 Jul 2018 11:00) (16 - 18)  SpO2: 97% (05 Jul 2018 11:00) (95% - 100%)        LABS:                          9.5    9.05  )-----------( 498      ( 05 Jul 2018 09:37 )             30.3         Mean Cell Volume : 78.5 fl  Mean Cell Hemoglobin : 24.6 pg  Mean Cell Hemoglobin Concentration : 31.4 gm/dL  Auto Neutrophil # : x  Auto Lymphocyte # : x  Auto Monocyte # : x  Auto Eosinophil # : x  Auto Basophil # : x  Auto Neutrophil % : x  Auto Lymphocyte % : x  Auto Monocyte % : x  Auto Eosinophil % : x  Auto Basophil % : x    Serial CBC's  07-05 @ 09:37  Hct-30.3 / Hgb-9.5 / Plat-498 / RBC-3.86 / WBC-9.05          Serial CBC's  07-04 @ 10:35  Hct-32.1 / Hgb-9.8 / Plat-368 / RBC-4.05 / WBC-7.45          Serial CBC's  07-03 @ 11:31  Hct-30.6 / Hgb-9.1 / Plat-581 / RBC-3.81 / WBC-6.60          Serial CBC's  07-02 @ 09:29  Hct-28.2 / Hgb-8.7 / Plat-503 / RBC-3.53 / WBC-6.44            07-05    133<L>  |  98  |  24<H>  ----------------------------<  131<H>  4.5   |  17<L>  |  0.65    Ca    8.8      05 Jul 2018 07:22            WBC Count: 9.05 K/uL (07-05-18 @ 09:37)  Hemoglobin: 9.5 g/dL (07-05-18 @ 09:37)  Hematocrit: 30.3 % (07-05-18 @ 09:37)  Platelet Count - Automated: 498 K/uL (07-05-18 @ 09:37)  WBC Count: 7.45 K/uL (07-04-18 @ 10:35)  Hemoglobin: 9.8 g/dL (07-04-18 @ 10:35)  Hematocrit: 32.1 % (07-04-18 @ 10:35)  Platelet Count - Automated: 368 K/uL (07-04-18 @ 10:35)  WBC Count: 6.60 K/uL (07-03-18 @ 11:31)  Hemoglobin: 9.1 g/dL (07-03-18 @ 11:31)  Hematocrit: 30.6 % (07-03-18 @ 11:31)  Platelet Count - Automated: 581 K/uL (07-03-18 @ 11:31)  WBC Count: 6.44 K/uL (07-02-18 @ 09:29)  Hemoglobin: 8.7 g/dL (07-02-18 @ 09:29)  Hematocrit: 28.2 % (07-02-18 @ 09:29)  Platelet Count - Automated: 503 K/uL (07-02-18 @ 09:29)  WBC Count: 7.99 K/uL (07-01-18 @ 09:49)  Hemoglobin: 8.8 g/dL (07-01-18 @ 09:49)  Hematocrit: 27.5 % (07-01-18 @ 09:49)  Platelet Count - Automated: 511 K/uL (07-01-18 @ 09:49)  Ferritin, Serum: 127 ng/mL (07-01-18 @ 09:49)  Vitamin B12, Serum: 1198 pg/mL (07-01-18 @ 09:49)  Folate, Serum: 15.9 ng/mL (07-01-18 @ 09:49)  WBC Count: 8.97 K/uL (06-30-18 @ 08:36)  Hemoglobin: 8.6 g/dL (06-30-18 @ 08:36)  Hematocrit: 28.5 % (06-30-18 @ 08:36)  Platelet Count - Automated: 532 K/uL (06-30-18 @ 08:36)  WBC Count: 7.8 K/uL (06-28-18 @ 16:55)  Hemoglobin: 10.0 g/dL (06-28-18 @ 16:55)  Hematocrit: 31.2 % (06-28-18 @ 16:55)  Platelet Count - Automated: 412 K/uL (06-28-18 @ 16:55)

## 2018-07-05 NOTE — DISCHARGE NOTE ADULT - HOSPITAL COURSE
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 She also c/o left wrist pain and swelling. Physical exam is notable for left wrist tenderness, erythema and swelling. Labs are notable for hyponatremia, hypokalemia ( hemolyzed sample), anemia, elevated AST. CT Head  shows several mass lesions with associated vasogenic edema. Left hand XR shows no fracture or dislocation.

## 2018-07-05 NOTE — PROGRESS NOTE ADULT - NEUROLOGICAL DETAILS
alert and oriented x 3
more awake and alert
alert and oriented x 3

## 2018-07-05 NOTE — DISCHARGE NOTE ADULT - MEDICATION SUMMARY - MEDICATIONS TO TAKE
I will START or STAY ON the medications listed below when I get home from the hospital:    Rolling Walker  -- Indication: For ambulation    Raised Toilet seat  -- Indication: For ambulation    Shower Chair  -- Indication: For ambulation    dexamethasone 4 mg oral tablet  -- 1 tab(s) by mouth 3 times a day  -- Indication: For Brain metastases    losartan 100 mg oral tablet  -- 1 tab(s) by mouth once a day noon  -- Indication: For Htn    levETIRAcetam 500 mg oral tablet  -- 1 tab(s) by mouth 2 times a day  -- Indication: For Brain metastases    Paxil CR 25 mg oral tablet, extended release  -- 1 tab(s) by mouth once a day (noon)  -- Indication: For Depression, unspecified depression type    Symbicort 160 mcg-4.5 mcg/inh inhalation aerosol  -- 2 puff(s) inhaled 2 times a day  -- Indication: For Chronic obstructive pulmonary disease, unspecified COPD type    amLODIPine 5 mg oral tablet  -- 1 tab(s) by mouth once a day  -- Indication: For Essential hypertension    famotidine 20 mg oral tablet  -- 1 tab(s) by mouth once a day, As Needed  -- Indication: For reflux    Bactrim 400 mg-80 mg oral tablet  -- 1 tab(s) by mouth 2 times a day   -- Avoid prolonged or excessive exposure to direct and/or artificial sunlight while taking this medication.  Finish all this medication unless otherwise directed by prescriber.  Medication should be taken with plenty of water.    -- Indication: For uti    Synthroid 50 mcg (0.05 mg) oral tablet  -- 1 tab(s) by mouth once a day am  -- Indication: For Hypothyroidism, unspecified type    Multiple Vitamins with Minerals oral tablet  -- 1 tab(s) by mouth once a day  -- Indication: For supplement

## 2018-07-05 NOTE — BRIEF OPERATIVE NOTE - PROCEDURE
<<-----Click on this checkbox to enter Procedure Infusaport implantation  07/05/2018  Infusaport implantation into right subclavian  Active  RADHA

## 2018-07-05 NOTE — PROGRESS NOTE ADULT - ASSESSMENT
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  Dr Shakir Ferrer did FNA on the mediastinal nodes on 18. The pathology showed squamous cell carcinoma which is 5-10 % + PD-L1. Pt is qualified to receive Keytruda, an immunotherapy drug.    Pt agreed to chemotherapy and immunotherapy. s/p Mediport placement today    Chemotherapy plans discussed with pt, pt's  and 2 sons, Shakir and Ananda.    Anemia: Adequate ferritin, B12 and folate levels. most likely anemia of chronic disease.  CBC stable. Repeat in am.    Change decadron from IV to PO with  dosage to 4 mg TID yesterday

## 2018-07-05 NOTE — DISCHARGE NOTE ADULT - CARE PLAN
Principal Discharge DX:	Falls frequently  Goal:	stable  Assessment and plan of treatment:	falls likely related to brain mets   Causes of fall may be due to illness, change in the medicines you take, unsafe or unfamiliar setting and conditions that affect eyesight, hearing, muscle strength, or balance.                                                            Certain medicines used for sleep, anxiety, or depression can cause falls. Adding new medicines, or changing doses of some medicines, can also affect your risk of falling. Your doctor might switch you to a different medicine.                                        Prevent falls by make your home safer – To avoid falling at home, get rid of things that might make you trip or slip. This might include furniture, electrical cords, clutter, and loose rugs. Keep your home well lit to avoid falls or accidents. Avoid storing things in high places so you don't have to reach or climb.                             Wear sturdy shoes that fit well – Wearing shoes with high heels or slippery soles, or shoes that are too loose, can lead to falls.                                                                                                                       Take vitamin D pills which might lower the risk of falls in older people. This is because vitamin D helps make bones and muscles stronger.                                                                                                                   Use a cane, walker, and other safety devices as these devices help you avoid falling, too. These include grab bars or a sturdy seat for the shower, non-slip bath mats, and hand rails or treads for the stairs (to prevent slipping). If you worry that you could fall, there are also alarm buttons that let you call for help if you fall and can't get up.   It is important to tell your doctor about any times you have fallen or almost fallen.  Seek immediate help for pain or injury after a fall.  Secondary Diagnosis:	Essential hypertension  Assessment and plan of treatment:	Low salt diet  Activity as tolerated.  Take all medication as prescribed.  Follow up with your medical doctor for routine blood pressure monitoring at your next visit.  Notify your doctor if you have any of the following symptoms:   Dizziness, Lightheadedness, Blurry vision, Headache, Chest pain, Shortness of breath  Secondary Diagnosis:	Depression, unspecified depression type  Assessment and plan of treatment:	SEEK IMMEDIATE CARE IF  You have thoughts about hurting yourself or others.  You lose touch with reality (have psychotic symptoms).  You are taking medicine for depression and have a serious side effect  Secondary Diagnosis:	Chronic obstructive pulmonary disease, unspecified COPD type  Assessment and plan of treatment:	Call your Health Care provider upon arrival home to make a follow up appointment within one week.  Take all inhalers as prescribed by your Health Care Provider.  Take steroids as prescribed by your Health Care Provider.  If your cough increases infrequency and severity and/or you have shortness of breath or increased shortness of breath call your Health Care Provider.  If you develop fever, chills, night sweats, malaise, and/or change in mental status call your Health care Provider.  Nutrition is very important.  Eat small frequent meals.  Increase your activity as tolerated.  Do not stay in bed all day  Secondary Diagnosis:	Brain metastases  Assessment and plan of treatment:	s/p mediport placement for chemotherapy as outpatient   follow up with your Oncologist Principal Discharge DX:	Falls frequently  Goal:	stable  Assessment and plan of treatment:	falls likely related to brain mets   Causes of fall may be due to illness, change in the medicines you take, unsafe or unfamiliar setting and conditions that affect eyesight, hearing, muscle strength, or balance.                                                            Certain medicines used for sleep, anxiety, or depression can cause falls. Adding new medicines, or changing doses of some medicines, can also affect your risk of falling. Your doctor might switch you to a different medicine.                                        Prevent falls by make your home safer – To avoid falling at home, get rid of things that might make you trip or slip. This might include furniture, electrical cords, clutter, and loose rugs. Keep your home well lit to avoid falls or accidents. Avoid storing things in high places so you don't have to reach or climb.                             Wear sturdy shoes that fit well – Wearing shoes with high heels or slippery soles, or shoes that are too loose, can lead to falls.                                                                                                                       Take vitamin D pills which might lower the risk of falls in older people. This is because vitamin D helps make bones and muscles stronger.                                                                                                                   Use a cane, walker, and other safety devices as these devices help you avoid falling, too. These include grab bars or a sturdy seat for the shower, non-slip bath mats, and hand rails or treads for the stairs (to prevent slipping). If you worry that you could fall, there are also alarm buttons that let you call for help if you fall and can't get up.   It is important to tell your doctor about any times you have fallen or almost fallen.  Seek immediate help for pain or injury after a fall.  Secondary Diagnosis:	Essential hypertension  Assessment and plan of treatment:	Low salt diet  Activity as tolerated.  Take all medication as prescribed.  Follow up with your medical doctor for routine blood pressure monitoring at your next visit.  Notify your doctor if you have any of the following symptoms:   Dizziness, Lightheadedness, Blurry vision, Headache, Chest pain, Shortness of breath  Secondary Diagnosis:	Depression, unspecified depression type  Assessment and plan of treatment:	SEEK IMMEDIATE CARE IF  You have thoughts about hurting yourself or others.  You lose touch with reality (have psychotic symptoms).  You are taking medicine for depression and have a serious side effect  Secondary Diagnosis:	Chronic obstructive pulmonary disease, unspecified COPD type  Assessment and plan of treatment:	Call your Health Care provider upon arrival home to make a follow up appointment within one week.  Take all inhalers as prescribed by your Health Care Provider.  Take steroids as prescribed by your Health Care Provider.  If your cough increases infrequency and severity and/or you have shortness of breath or increased shortness of breath call your Health Care Provider.  If you develop fever, chills, night sweats, malaise, and/or change in mental status call your Health care Provider.  Nutrition is very important.  Eat small frequent meals.  Increase your activity as tolerated.  Do not stay in bed all day  Secondary Diagnosis:	Brain metastases  Assessment and plan of treatment:	s/p mediport placement for chemotherapy as outpatient   follow up with your Oncologist  Secondary Diagnosis:	Urinary tract infection without hematuria, site unspecified  Assessment and plan of treatment:	HOME CARE INSTRUCTIONS  f you were prescribed antibiotics, take them exactly as your caregiver instructs you. Finish the medication even if you feel better after you have only taken some of the medication.  Drink enough water and fluids to keep your urine clear or pale yellow.  Avoid caffeine, tea, and carbonated beverages. They tend to irritate your bladder.  Empty your bladder often. Avoid holding urine for long periods of time.  Empty your bladder before and after sexual intercourse.  After a bowel movement, women should cleanse from front to back. Use each tissue only once.  SEEK MEDICAL CARE IF:  You have back pain.  You develop a fever.  Your symptoms do not begin to resolve within 3 days.  SEEK IMMEDIATE MEDICAL CARE IF:  You have severe back pain or lower abdominal pain.  You develop chills.  You have nausea or vomiting.  You have continued burning or discomfort with urination.

## 2018-07-05 NOTE — DISCHARGE NOTE ADULT - PLAN OF CARE
stable falls likely related to brain mets   Causes of fall may be due to illness, change in the medicines you take, unsafe or unfamiliar setting and conditions that affect eyesight, hearing, muscle strength, or balance.                                                            Certain medicines used for sleep, anxiety, or depression can cause falls. Adding new medicines, or changing doses of some medicines, can also affect your risk of falling. Your doctor might switch you to a different medicine.                                        Prevent falls by make your home safer – To avoid falling at home, get rid of things that might make you trip or slip. This might include furniture, electrical cords, clutter, and loose rugs. Keep your home well lit to avoid falls or accidents. Avoid storing things in high places so you don't have to reach or climb.                             Wear sturdy shoes that fit well – Wearing shoes with high heels or slippery soles, or shoes that are too loose, can lead to falls.                                                                                                                       Take vitamin D pills which might lower the risk of falls in older people. This is because vitamin D helps make bones and muscles stronger.                                                                                                                   Use a cane, walker, and other safety devices as these devices help you avoid falling, too. These include grab bars or a sturdy seat for the shower, non-slip bath mats, and hand rails or treads for the stairs (to prevent slipping). If you worry that you could fall, there are also alarm buttons that let you call for help if you fall and can't get up.   It is important to tell your doctor about any times you have fallen or almost fallen.  Seek immediate help for pain or injury after a fall. Low salt diet  Activity as tolerated.  Take all medication as prescribed.  Follow up with your medical doctor for routine blood pressure monitoring at your next visit.  Notify your doctor if you have any of the following symptoms:   Dizziness, Lightheadedness, Blurry vision, Headache, Chest pain, Shortness of breath SEEK IMMEDIATE CARE IF  You have thoughts about hurting yourself or others.  You lose touch with reality (have psychotic symptoms).  You are taking medicine for depression and have a serious side effect Call your Health Care provider upon arrival home to make a follow up appointment within one week.  Take all inhalers as prescribed by your Health Care Provider.  Take steroids as prescribed by your Health Care Provider.  If your cough increases infrequency and severity and/or you have shortness of breath or increased shortness of breath call your Health Care Provider.  If you develop fever, chills, night sweats, malaise, and/or change in mental status call your Health care Provider.  Nutrition is very important.  Eat small frequent meals.  Increase your activity as tolerated.  Do not stay in bed all day s/p mediport placement for chemotherapy as outpatient   follow up with your Oncologist HOME CARE INSTRUCTIONS  f you were prescribed antibiotics, take them exactly as your caregiver instructs you. Finish the medication even if you feel better after you have only taken some of the medication.  Drink enough water and fluids to keep your urine clear or pale yellow.  Avoid caffeine, tea, and carbonated beverages. They tend to irritate your bladder.  Empty your bladder often. Avoid holding urine for long periods of time.  Empty your bladder before and after sexual intercourse.  After a bowel movement, women should cleanse from front to back. Use each tissue only once.  SEEK MEDICAL CARE IF:  You have back pain.  You develop a fever.  Your symptoms do not begin to resolve within 3 days.  SEEK IMMEDIATE MEDICAL CARE IF:  You have severe back pain or lower abdominal pain.  You develop chills.  You have nausea or vomiting.  You have continued burning or discomfort with urination.

## 2018-07-05 NOTE — PROGRESS NOTE ADULT - SUBJECTIVE AND OBJECTIVE BOX
Patient is a 77y old  Female who presents with a chief complaint of weakness (2018 22:51)      INTERVAL HPI/OVERNIGHT EVENTS:    MEDICATIONS  (STANDING):  amLODIPine   Tablet 5 milliGRAM(s) Oral daily  buDESOnide 160 MICROgram(s)/formoterol 4.5 MICROgram(s) Inhaler 2 Puff(s) Inhalation two times a day  dexamethasone     Tablet 4 milliGRAM(s) Oral three times a day  famotidine    Tablet 20 milliGRAM(s) Oral daily  heparin  Injectable 5000 Unit(s) SubCutaneous every 8 hours  levETIRAcetam 500 milliGRAM(s) Oral two times a day  levothyroxine 50 MICROGram(s) Oral daily  losartan 100 milliGRAM(s) Oral daily  multivitamin/minerals 1 Tablet(s) Oral daily  PARoxetine 20 milliGRAM(s) Oral daily  sodium chloride 0.9%. 1000 milliLiter(s) (50 mL/Hr) IV Continuous <Continuous>    MEDICATIONS  (PRN):  ondansetron Injectable 4 milliGRAM(s) IV Push once PRN Nausea and/or Vomiting  oxyCODONE    5 mG/acetaminophen 325 mG 1 Tablet(s) Oral every 4 hours PRN Moderate Pain  oxyCODONE    IR 5 milliGRAM(s) Oral every 6 hours PRN Severe Pain (7 - 10)      Allergies    penicillin (Hives)    Intolerances        Vital Signs Last 24 Hrs  T(C): 37 (2018 08:44), Max: 37 (2018 08:44)  T(F): 98.6 (2018 08:44), Max: 98.6 (2018 08:44)  HR: 66 (2018 09:15) (64 - 77)  BP: 114/656 (2018 09:15) (114/656 - 169/74)  BP(mean): 93 (2018 09:15) (93 - 107)  RR: 16 (2018 09:15) (16 - 18)  SpO2: 99% (2018 09:15) (95% - 100%)    LABS:                        9.8    7.45  )-----------( 368      ( 2018 10:35 )             32.1     07-05    133<L>  |  98  |  24<H>  ----------------------------<  131<H>  4.5   |  17<L>  |  0.65    Ca    8.8      2018 07:22        Urinalysis Basic - ( 2018 07:25 )    Color: PL Yellow / Appearance: Clear / S.010 / pH: x  Gluc: x / Ketone: Negative  / Bili: Negative / Urobili: Negative   Blood: x / Protein: Negative / Nitrite: Negative   Leuk Esterase: Trace / RBC: 2-5 /HPF / WBC 5-10 /HPF   Sq Epi: x / Non Sq Epi: x / Bacteria: x        RADIOLOGY & ADDITIONAL TESTS:        Dr Narayanan [phone] Patient is a 77y old  Female who presents with a chief complaint of weakness (2018 22:51)    Pt  in pacu  INTERVAL HPI/OVERNIGHT EVENTS:    MEDICATIONS  (STANDING):  amLODIPine   Tablet 5 milliGRAM(s) Oral daily  buDESOnide 160 MICROgram(s)/formoterol 4.5 MICROgram(s) Inhaler 2 Puff(s) Inhalation two times a day  dexamethasone     Tablet 4 milliGRAM(s) Oral three times a day  famotidine    Tablet 20 milliGRAM(s) Oral daily  heparin  Injectable 5000 Unit(s) SubCutaneous every 8 hours  levETIRAcetam 500 milliGRAM(s) Oral two times a day  levothyroxine 50 MICROGram(s) Oral daily  losartan 100 milliGRAM(s) Oral daily  multivitamin/minerals 1 Tablet(s) Oral daily  PARoxetine 20 milliGRAM(s) Oral daily  sodium chloride 0.9%. 1000 milliLiter(s) (50 mL/Hr) IV Continuous <Continuous>    MEDICATIONS  (PRN):  ondansetron Injectable 4 milliGRAM(s) IV Push once PRN Nausea and/or Vomiting  oxyCODONE    5 mG/acetaminophen 325 mG 1 Tablet(s) Oral every 4 hours PRN Moderate Pain  oxyCODONE    IR 5 milliGRAM(s) Oral every 6 hours PRN Severe Pain (7 - 10)      Allergies    penicillin (Hives)    Intolerances        Vital Signs Last 24 Hrs  T(C): 37 (2018 08:44), Max: 37 (2018 08:44)  T(F): 98.6 (2018 08:44), Max: 98.6 (2018 08:44)  HR: 66 (2018 09:15) (64 - 77)  BP: 114/656 (2018 09:15) (114/656 - 169/74)  BP(mean): 93 (2018 09:15) (93 - 107)  RR: 16 (2018 09:15) (16 - 18)  SpO2: 99% (2018 09:15) (95% - 100%)    LABS:                        9.8    7.45  )-----------( 368      ( 2018 10:35 )             32.1     07-05    133<L>  |  98  |  24<H>  ----------------------------<  131<H>  4.5   |  17<L>  |  0.65    Ca    8.8      2018 07:22        Urinalysis Basic - ( 2018 07:25 )    Color: PL Yellow / Appearance: Clear / S.010 / pH: x  Gluc: x / Ketone: Negative  / Bili: Negative / Urobili: Negative   Blood: x / Protein: Negative / Nitrite: Negative   Leuk Esterase: Trace / RBC: 2-5 /HPF / WBC 5-10 /HPF   Sq Epi: x / Non Sq Epi: x / Bacteria: x        RADIOLOGY & ADDITIONAL TESTS:        Dr Narayanan [phone]

## 2018-07-06 VITALS
DIASTOLIC BLOOD PRESSURE: 73 MMHG | SYSTOLIC BLOOD PRESSURE: 116 MMHG | RESPIRATION RATE: 16 BRPM | HEART RATE: 77 BPM | OXYGEN SATURATION: 98 % | TEMPERATURE: 98 F

## 2018-07-06 LAB
-  AMIKACIN: SIGNIFICANT CHANGE UP
-  AMOXICILLIN/CLAVULANIC ACID: SIGNIFICANT CHANGE UP
-  AMPICILLIN/SULBACTAM: SIGNIFICANT CHANGE UP
-  AMPICILLIN: SIGNIFICANT CHANGE UP
-  AZTREONAM: SIGNIFICANT CHANGE UP
-  CEFAZOLIN: SIGNIFICANT CHANGE UP
-  CEFEPIME: SIGNIFICANT CHANGE UP
-  CEFOXITIN: SIGNIFICANT CHANGE UP
-  CEFTRIAXONE: SIGNIFICANT CHANGE UP
-  CIPROFLOXACIN: SIGNIFICANT CHANGE UP
-  ERTAPENEM: SIGNIFICANT CHANGE UP
-  GENTAMICIN: SIGNIFICANT CHANGE UP
-  IMIPENEM: SIGNIFICANT CHANGE UP
-  LEVOFLOXACIN: SIGNIFICANT CHANGE UP
-  MEROPENEM: SIGNIFICANT CHANGE UP
-  NITROFURANTOIN: SIGNIFICANT CHANGE UP
-  PIPERACILLIN/TAZOBACTAM: SIGNIFICANT CHANGE UP
-  TIGECYCLINE: SIGNIFICANT CHANGE UP
-  TOBRAMYCIN: SIGNIFICANT CHANGE UP
-  TRIMETHOPRIM/SULFAMETHOXAZOLE: SIGNIFICANT CHANGE UP
ANION GAP SERPL CALC-SCNC: 16 MMOL/L — SIGNIFICANT CHANGE UP (ref 5–17)
BUN SERPL-MCNC: 23 MG/DL — SIGNIFICANT CHANGE UP (ref 7–23)
CALCIUM SERPL-MCNC: 8.7 MG/DL — SIGNIFICANT CHANGE UP (ref 8.4–10.5)
CHLORIDE SERPL-SCNC: 99 MMOL/L — SIGNIFICANT CHANGE UP (ref 96–108)
CO2 SERPL-SCNC: 20 MMOL/L — LOW (ref 22–31)
CREAT SERPL-MCNC: 0.61 MG/DL — SIGNIFICANT CHANGE UP (ref 0.5–1.3)
CULTURE RESULTS: SIGNIFICANT CHANGE UP
GLUCOSE SERPL-MCNC: 224 MG/DL — HIGH (ref 70–99)
METHOD TYPE: SIGNIFICANT CHANGE UP
ORGANISM # SPEC MICROSCOPIC CNT: SIGNIFICANT CHANGE UP
ORGANISM # SPEC MICROSCOPIC CNT: SIGNIFICANT CHANGE UP
POTASSIUM SERPL-MCNC: 4 MMOL/L — SIGNIFICANT CHANGE UP (ref 3.5–5.3)
POTASSIUM SERPL-SCNC: 4 MMOL/L — SIGNIFICANT CHANGE UP (ref 3.5–5.3)
SODIUM SERPL-SCNC: 135 MMOL/L — SIGNIFICANT CHANGE UP (ref 135–145)
SPECIMEN SOURCE: SIGNIFICANT CHANGE UP

## 2018-07-06 PROCEDURE — 80053 COMPREHEN METABOLIC PANEL: CPT

## 2018-07-06 PROCEDURE — 97110 THERAPEUTIC EXERCISES: CPT

## 2018-07-06 PROCEDURE — 87086 URINE CULTURE/COLONY COUNT: CPT

## 2018-07-06 PROCEDURE — 82607 VITAMIN B-12: CPT

## 2018-07-06 PROCEDURE — 71045 X-RAY EXAM CHEST 1 VIEW: CPT

## 2018-07-06 PROCEDURE — 81001 URINALYSIS AUTO W/SCOPE: CPT

## 2018-07-06 PROCEDURE — 80048 BASIC METABOLIC PNL TOTAL CA: CPT

## 2018-07-06 PROCEDURE — 93005 ELECTROCARDIOGRAM TRACING: CPT

## 2018-07-06 PROCEDURE — 96374 THER/PROPH/DIAG INJ IV PUSH: CPT

## 2018-07-06 PROCEDURE — 85027 COMPLETE CBC AUTOMATED: CPT

## 2018-07-06 PROCEDURE — 84132 ASSAY OF SERUM POTASSIUM: CPT

## 2018-07-06 PROCEDURE — 94640 AIRWAY INHALATION TREATMENT: CPT

## 2018-07-06 PROCEDURE — 86901 BLOOD TYPING SEROLOGIC RH(D): CPT

## 2018-07-06 PROCEDURE — 85730 THROMBOPLASTIN TIME PARTIAL: CPT

## 2018-07-06 PROCEDURE — 76000 FLUOROSCOPY <1 HR PHYS/QHP: CPT

## 2018-07-06 PROCEDURE — 86900 BLOOD TYPING SEROLOGIC ABO: CPT

## 2018-07-06 PROCEDURE — 82728 ASSAY OF FERRITIN: CPT

## 2018-07-06 PROCEDURE — 86850 RBC ANTIBODY SCREEN: CPT

## 2018-07-06 PROCEDURE — 73110 X-RAY EXAM OF WRIST: CPT

## 2018-07-06 PROCEDURE — 97116 GAIT TRAINING THERAPY: CPT

## 2018-07-06 PROCEDURE — 87186 SC STD MICRODIL/AGAR DIL: CPT

## 2018-07-06 PROCEDURE — C1788: CPT

## 2018-07-06 PROCEDURE — 99285 EMERGENCY DEPT VISIT HI MDM: CPT | Mod: 25

## 2018-07-06 PROCEDURE — 82746 ASSAY OF FOLIC ACID SERUM: CPT

## 2018-07-06 PROCEDURE — 70450 CT HEAD/BRAIN W/O DYE: CPT

## 2018-07-06 PROCEDURE — 73130 X-RAY EXAM OF HAND: CPT

## 2018-07-06 PROCEDURE — 97162 PT EVAL MOD COMPLEX 30 MIN: CPT

## 2018-07-06 PROCEDURE — 72131 CT LUMBAR SPINE W/O DYE: CPT

## 2018-07-06 PROCEDURE — 85610 PROTHROMBIN TIME: CPT

## 2018-07-06 RX ORDER — DEXAMETHASONE 0.5 MG/5ML
1 ELIXIR ORAL
Qty: 90 | Refills: 0 | OUTPATIENT
Start: 2018-07-06 | End: 2018-08-04

## 2018-07-06 RX ORDER — LEVETIRACETAM 250 MG/1
1 TABLET, FILM COATED ORAL
Qty: 60 | Refills: 0 | OUTPATIENT
Start: 2018-07-06 | End: 2018-08-04

## 2018-07-06 RX ADMIN — HEPARIN SODIUM 5000 UNIT(S): 5000 INJECTION INTRAVENOUS; SUBCUTANEOUS at 05:41

## 2018-07-06 RX ADMIN — FAMOTIDINE 20 MILLIGRAM(S): 10 INJECTION INTRAVENOUS at 12:16

## 2018-07-06 RX ADMIN — LOSARTAN POTASSIUM 100 MILLIGRAM(S): 100 TABLET, FILM COATED ORAL at 05:40

## 2018-07-06 RX ADMIN — Medication 4 MILLIGRAM(S): at 14:54

## 2018-07-06 RX ADMIN — Medication 20 MILLIGRAM(S): at 12:16

## 2018-07-06 RX ADMIN — BUDESONIDE AND FORMOTEROL FUMARATE DIHYDRATE 2 PUFF(S): 160; 4.5 AEROSOL RESPIRATORY (INHALATION) at 05:41

## 2018-07-06 RX ADMIN — Medication 4 MILLIGRAM(S): at 05:40

## 2018-07-06 RX ADMIN — Medication 1 TABLET(S): at 12:16

## 2018-07-06 RX ADMIN — AMLODIPINE BESYLATE 5 MILLIGRAM(S): 2.5 TABLET ORAL at 05:40

## 2018-07-06 RX ADMIN — LEVETIRACETAM 500 MILLIGRAM(S): 250 TABLET, FILM COATED ORAL at 05:40

## 2018-07-06 RX ADMIN — HEPARIN SODIUM 5000 UNIT(S): 5000 INJECTION INTRAVENOUS; SUBCUTANEOUS at 14:53

## 2018-07-06 RX ADMIN — Medication 50 MICROGRAM(S): at 05:40

## 2018-07-06 NOTE — PROGRESS NOTE ADULT - SUBJECTIVE AND OBJECTIVE BOX
Patient is a 77y old  Female who presents with a chief complaint of weakness (05 Jul 2018 18:29)      INTERVAL HPI/OVERNIGHT EVENTS:    MEDICATIONS  (STANDING):  amLODIPine   Tablet 5 milliGRAM(s) Oral daily  buDESOnide 160 MICROgram(s)/formoterol 4.5 MICROgram(s) Inhaler 2 Puff(s) Inhalation two times a day  dexamethasone     Tablet 4 milliGRAM(s) Oral three times a day  famotidine    Tablet 20 milliGRAM(s) Oral daily  heparin  Injectable 5000 Unit(s) SubCutaneous every 8 hours  levETIRAcetam 500 milliGRAM(s) Oral two times a day  levothyroxine 50 MICROGram(s) Oral daily  losartan 100 milliGRAM(s) Oral daily  multivitamin/minerals 1 Tablet(s) Oral daily  PARoxetine 20 milliGRAM(s) Oral daily    MEDICATIONS  (PRN):      Allergies    penicillin (Hives)    Intolerances        Vital Signs Last 24 Hrs  T(C): 36.5 (06 Jul 2018 07:40), Max: 37.1 (05 Jul 2018 22:00)  T(F): 97.7 (06 Jul 2018 07:40), Max: 98.8 (05 Jul 2018 22:00)  HR: 67 (06 Jul 2018 07:40) (65 - 77)  BP: 119/61 (06 Jul 2018 07:40) (119/61 - 155/71)  BP(mean): --  RR: 16 (06 Jul 2018 07:40) (16 - 18)  SpO2: 96% (06 Jul 2018 07:40) (96% - 99%)    LABS:                        9.5    9.05  )-----------( 498      ( 05 Jul 2018 09:37 )             30.3     07-05    133<L>  |  98  |  24<H>  ----------------------------<  131<H>  4.5   |  17<L>  |  0.65    Ca    8.8      05 Jul 2018 07:22            RADIOLOGY & ADDITIONAL TESTS:        Dr Narayanan [phone]

## 2018-07-06 NOTE — PROGRESS NOTE ADULT - RS GEN PE MLT RESP DETAILS PC
respirations non-labored/clear to auscultation bilaterally
improved bs r and l
clear to auscultation bilaterally/respirations non-labored
clear to auscultation bilaterally/respirations non-labored
respirations non-labored/clear to auscultation bilaterally
clear to auscultation bilaterally/respirations non-labored
respirations non-labored/clear to auscultation bilaterally
respirations non-labored/clear to auscultation bilaterally

## 2018-07-06 NOTE — PROGRESS NOTE ADULT - NEGATIVE RESPIRATORY AND THORAX SYMPTOMS
no cough/no wheezing
no dyspnea/no wheezing
no cough/no dyspnea/no wheezing
no dyspnea/no cough/no wheezing
no wheezing/no cough/no dyspnea
no cough/no dyspnea/no wheezing
no dyspnea/no cough/no wheezing

## 2018-07-06 NOTE — PROGRESS NOTE ADULT - ASSESSMENT
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  Dr Shakri Ferrer did FNA on the mediastinal nodes on 18. The pathology showed squamous cell carcinoma which is 5-10 % + PD-L1. Pt is qualified to receive Keytruda, an immunotherapy drug.    Pt agreed to chemotherapy and immunotherapy. s/p Mediport placement yesterday.    Chemotherapy plans discussed with pt, pt's  and 2 sons, Shakir and Ananda.    Anemia: Adequate ferritin, B12 and folate levels. most likely anemia of chronic disease.  CBC stable.     Change decadron from IV to PO with  dosage to 4 mg TID with Pepcid.

## 2018-07-06 NOTE — PROGRESS NOTE ADULT - PSYCHIATRIC DETAILS
normal affect/normal behavior
normal affect/normal behavior
normal behavior/normal affect
normal behavior/normal affect
normal affect/normal behavior
normal behavior/normal affect
normal affect/normal behavior

## 2018-07-06 NOTE — PROGRESS NOTE ADULT - RESPIRATORY
detailed exam
Breath Sounds equal & clear to percussion & auscultation, no accessory muscle use
detailed exam
detailed exam
Breath Sounds equal & clear to percussion & auscultation, no accessory muscle use
detailed exam

## 2018-07-06 NOTE — PROGRESS NOTE ADULT - GASTROINTESTINAL DETAILS
soft/nontender/no masses palpable/no distention
soft/no organomegaly/no masses palpable/nontender/no distention
no distention/no rebound tenderness/nontender/no masses palpable
no organomegaly/no masses palpable/no distention/soft/nontender
soft/nontender/no masses palpable/no distention
poor appetite/soft/nontender/no masses palpable/no distention
soft/nontender/no distention/no masses palpable

## 2018-07-06 NOTE — PROGRESS NOTE ADULT - MS EXT PE MLT D E PC
no cyanosis/no pedal edema/clubbing
no pedal edema/no cyanosis/clubbing
no cyanosis/no pedal edema/clubbing
no cyanosis/clubbing/no pedal edema
no cyanosis/clubbing/no pedal edema

## 2018-07-06 NOTE — PROGRESS NOTE ADULT - NEGATIVE GENERAL SYMPTOMS
no chills/no fever
no fever/no chills
no chills/no fever
no fever/no chills
no fever/no chills
no chills/no fever
no chills/no fever

## 2018-07-06 NOTE — PROGRESS NOTE ADULT - CONSTITUTIONAL DETAILS
no distress
no distress/Thin, chronically ill
no distress

## 2018-07-06 NOTE — PROGRESS NOTE ADULT - SUBJECTIVE AND OBJECTIVE BOX
Patient is a 77y old  Female who presents with a chief complaint of weakness (05 Jul 2018 18:29)       Pt is seen and examined  pt is awake and sitting up in chair  pt seems comfortable             MEDICATIONS  (STANDING):  amLODIPine   Tablet 5 milliGRAM(s) Oral daily  buDESOnide 160 MICROgram(s)/formoterol 4.5 MICROgram(s) Inhaler 2 Puff(s) Inhalation two times a day  dexamethasone     Tablet 4 milliGRAM(s) Oral three times a day  famotidine    Tablet 20 milliGRAM(s) Oral daily  heparin  Injectable 5000 Unit(s) SubCutaneous every 8 hours  levETIRAcetam 500 milliGRAM(s) Oral two times a day  levothyroxine 50 MICROGram(s) Oral daily  losartan 100 milliGRAM(s) Oral daily  multivitamin/minerals 1 Tablet(s) Oral daily  PARoxetine 20 milliGRAM(s) Oral daily    MEDICATIONS  (PRN):      Allergies    penicillin (Hives)          Vital Signs Last 24 Hrs  T(C): 36.6 (06 Jul 2018 11:25), Max: 37.1 (05 Jul 2018 22:00)  T(F): 97.9 (06 Jul 2018 11:25), Max: 98.8 (05 Jul 2018 22:00)  HR: 77 (06 Jul 2018 11:25) (65 - 77)  BP: 116/73 (06 Jul 2018 11:25) (116/73 - 155/71)  BP(mean): --  RR: 16 (06 Jul 2018 11:25) (16 - 18)  SpO2: 98% (06 Jul 2018 11:25) (96% - 99%)        LABS:                          9.5    9.05  )-----------( 498      ( 05 Jul 2018 09:37 )             30.3         Mean Cell Volume : 78.5 fl  Mean Cell Hemoglobin : 24.6 pg  Mean Cell Hemoglobin Concentration : 31.4 gm/dL  Auto Neutrophil # : x  Auto Lymphocyte # : x  Auto Monocyte # : x  Auto Eosinophil # : x  Auto Basophil # : x  Auto Neutrophil % : x  Auto Lymphocyte % : x  Auto Monocyte % : x  Auto Eosinophil % : x  Auto Basophil % : x    Serial CBC's  07-05 @ 09:37  Hct-30.3 / Hgb-9.5 / Plat-498 / RBC-3.86 / WBC-9.05          Serial CBC's  07-04 @ 10:35  Hct-32.1 / Hgb-9.8 / Plat-368 / RBC-4.05 / WBC-7.45          Serial CBC's  07-03 @ 11:31  Hct-30.6 / Hgb-9.1 / Plat-581 / RBC-3.81 / WBC-6.60            07-06    135  |  99  |  23  ----------------------------<  224<H>  4.0   |  20<L>  |  0.61    Ca    8.7      06 Jul 2018 10:57            WBC Count: 9.05 K/uL (07-05-18 @ 09:37)  Hemoglobin: 9.5 g/dL (07-05-18 @ 09:37)  Hematocrit: 30.3 % (07-05-18 @ 09:37)  Platelet Count - Automated: 498 K/uL (07-05-18 @ 09:37)  WBC Count: 7.45 K/uL (07-04-18 @ 10:35)  Hemoglobin: 9.8 g/dL (07-04-18 @ 10:35)  Hematocrit: 32.1 % (07-04-18 @ 10:35)  Platelet Count - Automated: 368 K/uL (07-04-18 @ 10:35)  WBC Count: 6.60 K/uL (07-03-18 @ 11:31)  Hemoglobin: 9.1 g/dL (07-03-18 @ 11:31)  Hematocrit: 30.6 % (07-03-18 @ 11:31)  Platelet Count - Automated: 581 K/uL (07-03-18 @ 11:31)  WBC Count: 6.44 K/uL (07-02-18 @ 09:29)  Hemoglobin: 8.7 g/dL (07-02-18 @ 09:29)  Hematocrit: 28.2 % (07-02-18 @ 09:29)  Platelet Count - Automated: 503 K/uL (07-02-18 @ 09:29)  WBC Count: 7.99 K/uL (07-01-18 @ 09:49)  Hemoglobin: 8.8 g/dL (07-01-18 @ 09:49)  Hematocrit: 27.5 % (07-01-18 @ 09:49)  Platelet Count - Automated: 511 K/uL (07-01-18 @ 09:49)  Ferritin, Serum: 127 ng/mL (07-01-18 @ 09:49)  Vitamin B12, Serum: 1198 pg/mL (07-01-18 @ 09:49)  Folate, Serum: 15.9 ng/mL (07-01-18 @ 09:49)  WBC Count: 8.97 K/uL (06-30-18 @ 08:36)  Hemoglobin: 8.6 g/dL (06-30-18 @ 08:36)  Hematocrit: 28.5 % (06-30-18 @ 08:36)  Platelet Count - Automated: 532 K/uL (06-30-18 @ 08:36)  WBC Count: 7.8 K/uL (06-28-18 @ 16:55)  Hemoglobin: 10.0 g/dL (06-28-18 @ 16:55)  Hematocrit: 31.2 % (06-28-18 @ 16:55)  Platelet Count - Automated: 412 K/uL (06-28-18 @ 16:55)

## 2018-07-06 NOTE — PROGRESS NOTE ADULT - PROVIDER SPECIALTY LIST ADULT
Family Medicine
Heme/Onc
Family Medicine
Heme/Onc

## 2018-07-06 NOTE — PROGRESS NOTE ADULT - ASSESSMENT
Squamous cell ca- mets to  brain- bcc-  breast  ca Squamous cell ca- mets to  brain- bcc-  breast  ca   molst  form  completed  per pt  request  at  bedside

## 2018-07-27 PROBLEM — C44.319 BASAL CELL CARCINOMA OF LEFT TEMPLE REGION: Status: ACTIVE | Noted: 2017-06-12

## 2018-07-27 PROBLEM — Z85.828 HISTORY OF BASAL CELL CARCINOMA: Status: ACTIVE | Noted: 2017-10-02

## 2019-01-02 NOTE — ASU PREOP CHECKLIST - ADVANCE DIRECTIVE ADDRESSED/READDRESSED
done My signature below certifies that the above stated patient is homebound and upon completion of the Face-To-Face encounter, has the need for intermittent skilled nursing, physical therapy and/or speech or occupational therapy services in their home for their current diagnosis as outlined in their initial plan of care. These services will continue to be monitored by myself or another physician.

## 2019-09-04 NOTE — H&P PST ADULT - RESPIRATORY RATE (BREATHS/MIN)
How Severe Is Your Skin Discoloration?: mild
Additional History: Patient was put on plaquinil and melaxican about 3 months ago for RA.
16

## 2019-09-05 NOTE — DISCHARGE NOTE ADULT - NSFTFSERV2RD_GEN_ALL_CORE
[General Appearance - Well Nourished] : well nourished [General Appearance - In No Acute Distress] : in no acute distress [General Appearance - Alert] : alert [General Appearance - Well-Appearing] : healthy appearing [General Appearance - Well Developed] : well developed [Oriented To Time, Place, And Person] : oriented to person, place, and time [Impaired Insight] : insight and judgment were intact [Mood] : the mood was normal [Affect] : the affect was normal [Memory Recent] : recent memory was not impaired [Person] : oriented to person [Place] : oriented to place [Time] : oriented to time [Cranial Nerves Optic (II)] : visual acuity intact bilaterally,  pupils equal round and reactive to light [Cranial Nerves Facial (VII)] : face symmetrical [Cranial Nerves Trigeminal (V)] : facial sensation intact symmetrically [Cranial Nerves Oculomotor (III)] : extraocular motion intact [Cranial Nerves Glossopharyngeal (IX)] : tongue and palate midline [Cranial Nerves Vestibulocochlear (VIII)] : hearing was intact bilaterally [Cranial Nerves Accessory (XI - Cranial And Spinal)] : head turning and shoulder shrug symmetric [Cranial Nerves Hypoglossal (XII)] : there was no tongue deviation with protrusion [Motor Handedness Right-Handed] : the patient is right hand dominant [Limited Balance] : the patient's balance was impaired [Sclera] : the sclera and conjunctiva were normal [PERRL With Normal Accommodation] : pupils were equal in size, round, reactive to light, with normal accommodation [Extraocular Movements] : extraocular movements were intact [Outer Ear] : the ears and nose were normal in appearance [Hearing Threshold Finger Rub Not [place]] : hearing was normal [Oropharynx] : the oropharynx was normal [Neck Appearance] : the appearance of the neck was normal [Respiration, Rhythm And Depth] : normal respiratory rhythm and effort [Exaggerated Use Of Accessory Muscles For Inspiration] : no accessory muscle use [Heart Rate And Rhythm] : heart rate was normal and rhythm regular [Involuntary Movements] : no involuntary movements were seen [Skin Color & Pigmentation] : normal skin color and pigmentation [2] : L4/5 ankle dorsiflexors 2/5 [3] : L4/5 extensor hallucis longus 3/5 [4] : S1 flexor hallucis longus 4/5 [1+] : Brachioradialis left 1+ [3+] : Patella left 3+ [0] : Ankle jerk right 0 [5] : L2 Iliopsoas 5/5 [Bowel Sounds] : normal bowel sounds [] : the neck was supple [Abdomen Soft] : soft [FreeTextEntry7] : Left anterolateral leg L3/L4 is 5/10, Right  [Straight-Leg Raise Test - Left] : straight leg raise of the left leg was negative [Straight-Leg Raise Test - Right] : straight leg raise  of the right leg was negative [FreeTextEntry1] : Ambulates with cane assistance. Left ankle brace. Nursing/Physical Therapy

## 2019-09-30 NOTE — ASU PATIENT PROFILE, ADULT - FALL HARM RISK CONCLUSION
----- Message from Charisse Iraheta sent at 9/30/2019 11:09 AM CDT -----  Contact: Pt  Pt is requesting a callback from Stephanie says she completed her blood work and need to go over results before her 11AM visit     Pt can be reached at [phone] says she have phone in her hand now  
Spoke with pt about lab work and cancelled blood gerald   
Universal Safety Interventions

## 2020-05-04 NOTE — ASU PREOP CHECKLIST - RESPIRATORY RATE (BREATHS/MIN)
INR at goal. Medications and chart reviewed. No changes noted to necessitate adjustment of warfarin or follow-up plan. See calendar.       18

## 2020-05-21 NOTE — H&P PST ADULT - ENDOCRINE
Patient reports no known chronic heart disease.  Cardiac echo showed severe systolic dysfunction - EF 20%.      Cardiology consulted and assisting with management.   Appreciate input.     Further management per cardiology  Optimize CHF regimen.  Preload and afterload reduction.[Furosemide IV 40 mg every 12 hours]  Input and Output monitoring  Daily weighing.  Dietary salt restriction.  Alcohol and tobacco avoidance.     details…

## 2020-05-24 NOTE — H&P PST ADULT - VENOUS THROMBOEMBOLISM AGE
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 The patient is currently receiving Induction chemotherapy with  Dauno/Cytarabine/Mylotarg. The patients hospital course has been complicated by bleeding diathesis due to platelet refractory status and spontaneous left frontal small SDH. The patient has pancytopenia secondary to chemotherapy and disease condition. 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 The patient is currently receiving Induction chemotherapy with  Dauno/Cytarabine/Mylotarg. The patients hospital course has been complicated by bleeding diathesis due to platelet refractory status and spontaneous left frontal small SDH. The patient has pancytopenia secondary to chemotherapy and disease condition. 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 The patient is currently receiving Induction chemotherapy with  Dauno/Cytarabine/Mylotarg. The patients hospital course has been complicated by bleeding diathesis due to platelet refractory status and spontaneous left frontal small SDH. The patient has pancytopenia secondary to chemotherapy and disease condition. 60-75 yrs

## 2020-06-10 NOTE — ASU PREOPERATIVE ASSESSMENT, ADULT ([facility] ONLY) - LOCKER #
She can increase the diclofenac to 75 mg which is 1.5 tabs twice daily. MRI showed mild degenerative findings and nothing that would explain left sided weakness. She needs to call her PCP or go to the ER regarding the weakness as I cannot explain that as coming from her back. She should continue PT and follow up with Dr. Kyle Duran when she gets back. 17

## 2020-09-17 NOTE — H&P PST ADULT - NSANTHBPHIGHRD_ENT_A_CORE
Impression: S/P CE/Standard IOL/SA60WF +23.0 OD - 16 Days. Encounter for surgical aftercare following surgery on a sense organ  Z48.810. Non healing epi defect under Ambio disc   frosting does not appear infectious After ambio and BCL removed 15-20 % healing of epi defect noted. Frosting possibly deposits from Ofloxacin. Plan: Patient to use PF qd OD
KET bid OD Tobramycin qid OD Blink q hour at different hour than other drops RTC Tomorrow afternoon with Dr. Stephanie Setphens
Yes

## 2020-11-18 NOTE — ASU PREOP CHECKLIST - BSA (M2)
1.42 [Follow Up] : a follow up visit [Mother] : mother [Family Member] : family member [FreeTextEntry1] : Left Club Foot

## 2021-05-06 NOTE — ASU DISCHARGE PLAN (ADULT/PEDIATRIC). - FOLLOWUP APPOINTMENT CLINIC/PHYSICIAN
Airway patent, Nasal mucosa clear. Mouth with normal mucosa. Throat has no vesicles, no oropharyngeal exudates and uvula is midline. Contact MD office to schedule follow up appointment.

## 2021-12-06 NOTE — PATIENT PROFILE ADULT. - PURPOSEFUL PROACTIVE ROUNDING
Patient Epidermal Autograft Text: The defect edges were debeveled with a #15 scalpel blade.  Given the location of the defect, shape of the defect and the proximity to free margins an epidermal autograft was deemed most appropriate.  Using a sterile surgical marker, the primary defect shape was transferred to the donor site. The epidermal graft was then harvested.  The skin graft was then placed in the primary defect and oriented appropriately.

## 2022-06-21 NOTE — H&P PST ADULT - ENMT COMMENTS
Tazorac Pregnancy And Lactation Text: This medication is not safe during pregnancy. It is unknown if this medication is excreted in breast milk. skin intact on nose

## 2022-10-14 NOTE — H&P PST ADULT - NEGATIVE RESPIRATORY AND THORAX SYMPTOMS
No new care gaps identified.  [facility] Embedded Care Gaps. Reference number: [id number]. 10/14/2022   3:52:00 PM CDT  
Rx pending   
no pleuritic chest pain/no wheezing/no cough/no dyspnea/no hemoptysis

## 2023-01-23 NOTE — H&P PST ADULT - NEGATIVE SKIN SYMPTOMS
[FreeTextEntry1] : Will order a new CPAP machine. She will continue use of CPAP. basal cell carcinoma see HPI/no rash/no itching
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
## 2024-03-11 NOTE — ED PROVIDER NOTE - CROS ED NEURO POS
Post-Op Assessment Note    CV Status:  Stable  Pain Score: 0    Pain management: adequate       Mental Status:  Sleepy and arousable   Hydration Status:  Stable   PONV Controlled:  Controlled   Airway Patency:  Patent     Post Op Vitals Reviewed: Yes    No anethesia notable event occurred.    Staff: CRNA               BP   124/70   Temp      Pulse  96   Resp   16   SpO2   99     
HEADACHE/DIFFICULTY WALKING / IMBALANCE/dizziness

## 2024-07-10 NOTE — H&P PST ADULT - NS ABD PE RECTAL EXAM
HPI    77 Y/o male is here for routine eye exam with no C/o about ocular health   Pt denies pain and discomfort   Occ floater     Eye med: no gtt   Last edited by Juice Egan MA on 7/10/2024  8:41 AM.            Assessment /Plan     For exam results, see Encounter Report.    Type 2 diabetes mellitus without retinopathy    Nuclear sclerosis, bilateral    Glaucoma screening    Presbyopia      Cat OU--discussed surgery, but pt wishes to wait.  Wishes new Rx  DM- WITHOUT RETINOPATHY.  Advised yearly DFE    PLAN:    Rtc 1 yr                   
patient refused

## 2025-03-18 NOTE — PATIENT PROFILE ADULT. - AS SC BRADEN SENSORY
(4) no impairment Detail Level: Zone Plan: Skyrizi q 3 months. Pt needs next injection in 10 weeks\\n\\nTolerating the medicine well and his skin is clear Render In Strict Bullet Format?: No